# Patient Record
Sex: MALE | Race: WHITE | HISPANIC OR LATINO | URBAN - METROPOLITAN AREA
[De-identification: names, ages, dates, MRNs, and addresses within clinical notes are randomized per-mention and may not be internally consistent; named-entity substitution may affect disease eponyms.]

---

## 2019-01-27 ENCOUNTER — INPATIENT (INPATIENT)
Facility: HOSPITAL | Age: 74
LOS: 3 days | Discharge: HOME | DRG: 390 | End: 2019-01-31
Attending: SPECIALIST | Admitting: SPECIALIST
Payer: MEDICARE

## 2019-01-27 VITALS
RESPIRATION RATE: 18 BRPM | DIASTOLIC BLOOD PRESSURE: 70 MMHG | HEART RATE: 95 BPM | TEMPERATURE: 98 F | OXYGEN SATURATION: 96 % | SYSTOLIC BLOOD PRESSURE: 110 MMHG

## 2019-01-27 DIAGNOSIS — E03.9 HYPOTHYROIDISM, UNSPECIFIED: ICD-10-CM

## 2019-01-27 DIAGNOSIS — K56.609 UNSPECIFIED INTESTINAL OBSTRUCTION, UNSPECIFIED AS TO PARTIAL VERSUS COMPLETE OBSTRUCTION: ICD-10-CM

## 2019-01-27 DIAGNOSIS — I10 ESSENTIAL (PRIMARY) HYPERTENSION: ICD-10-CM

## 2019-01-27 DIAGNOSIS — Z90.49 ACQUIRED ABSENCE OF OTHER SPECIFIED PARTS OF DIGESTIVE TRACT: Chronic | ICD-10-CM

## 2019-01-27 DIAGNOSIS — N40.0 BENIGN PROSTATIC HYPERPLASIA WITHOUT LOWER URINARY TRACT SYMPTOMS: ICD-10-CM

## 2019-01-27 LAB
ALBUMIN SERPL ELPH-MCNC: 3.8 G/DL — SIGNIFICANT CHANGE UP (ref 3.5–5)
ALP SERPL-CCNC: 87 U/L — SIGNIFICANT CHANGE UP (ref 40–120)
ALT FLD-CCNC: 37 U/L DA — SIGNIFICANT CHANGE UP (ref 10–60)
ANION GAP SERPL CALC-SCNC: 8 MMOL/L — SIGNIFICANT CHANGE UP (ref 5–17)
APPEARANCE UR: CLEAR — SIGNIFICANT CHANGE UP
APTT BLD: 27 SEC — LOW (ref 27.5–36.3)
AST SERPL-CCNC: 27 U/L — SIGNIFICANT CHANGE UP (ref 10–40)
BASOPHILS # BLD AUTO: 0.1 K/UL — SIGNIFICANT CHANGE UP (ref 0–0.2)
BASOPHILS NFR BLD AUTO: 0.5 % — SIGNIFICANT CHANGE UP (ref 0–2)
BILIRUB SERPL-MCNC: 0.9 MG/DL — SIGNIFICANT CHANGE UP (ref 0.2–1.2)
BILIRUB UR-MCNC: ABNORMAL
BUN SERPL-MCNC: 30 MG/DL — HIGH (ref 7–18)
CALCIUM SERPL-MCNC: 8.9 MG/DL — SIGNIFICANT CHANGE UP (ref 8.4–10.5)
CHLORIDE SERPL-SCNC: 101 MMOL/L — SIGNIFICANT CHANGE UP (ref 96–108)
CO2 SERPL-SCNC: 29 MMOL/L — SIGNIFICANT CHANGE UP (ref 22–31)
COLOR SPEC: YELLOW — SIGNIFICANT CHANGE UP
CREAT SERPL-MCNC: 1.05 MG/DL — SIGNIFICANT CHANGE UP (ref 0.5–1.3)
DIFF PNL FLD: ABNORMAL
EOSINOPHIL # BLD AUTO: 0.1 K/UL — SIGNIFICANT CHANGE UP (ref 0–0.5)
EOSINOPHIL NFR BLD AUTO: 0.5 % — SIGNIFICANT CHANGE UP (ref 0–6)
GLUCOSE SERPL-MCNC: 120 MG/DL — HIGH (ref 70–99)
GLUCOSE UR QL: NEGATIVE — SIGNIFICANT CHANGE UP
HCT VFR BLD CALC: 42.7 % — SIGNIFICANT CHANGE UP (ref 39–50)
HGB BLD-MCNC: 14.1 G/DL — SIGNIFICANT CHANGE UP (ref 13–17)
INR BLD: 1.1 RATIO — SIGNIFICANT CHANGE UP (ref 0.88–1.16)
KETONES UR-MCNC: ABNORMAL
LACTATE SERPL-SCNC: 1.2 MMOL/L — SIGNIFICANT CHANGE UP (ref 0.7–2)
LEUKOCYTE ESTERASE UR-ACNC: ABNORMAL
LIDOCAIN IGE QN: 107 U/L — SIGNIFICANT CHANGE UP (ref 73–393)
LYMPHOCYTES # BLD AUTO: 1.9 K/UL — SIGNIFICANT CHANGE UP (ref 1–3.3)
LYMPHOCYTES # BLD AUTO: 18.1 % — SIGNIFICANT CHANGE UP (ref 13–44)
MCHC RBC-ENTMCNC: 29 PG — SIGNIFICANT CHANGE UP (ref 27–34)
MCHC RBC-ENTMCNC: 33 GM/DL — SIGNIFICANT CHANGE UP (ref 32–36)
MCV RBC AUTO: 88 FL — SIGNIFICANT CHANGE UP (ref 80–100)
MONOCYTES # BLD AUTO: 0.8 K/UL — SIGNIFICANT CHANGE UP (ref 0–0.9)
MONOCYTES NFR BLD AUTO: 7.6 % — SIGNIFICANT CHANGE UP (ref 2–14)
NEUTROPHILS # BLD AUTO: 7.7 K/UL — HIGH (ref 1.8–7.4)
NEUTROPHILS NFR BLD AUTO: 73.3 % — SIGNIFICANT CHANGE UP (ref 43–77)
NITRITE UR-MCNC: NEGATIVE — SIGNIFICANT CHANGE UP
PH UR: 5 — SIGNIFICANT CHANGE UP (ref 5–8)
PLATELET # BLD AUTO: 308 K/UL — SIGNIFICANT CHANGE UP (ref 150–400)
POTASSIUM SERPL-MCNC: 3.3 MMOL/L — LOW (ref 3.5–5.3)
POTASSIUM SERPL-SCNC: 3.3 MMOL/L — LOW (ref 3.5–5.3)
PROT SERPL-MCNC: 7.7 G/DL — SIGNIFICANT CHANGE UP (ref 6–8.3)
PROT UR-MCNC: 100
PROTHROM AB SERPL-ACNC: 12.2 SEC — SIGNIFICANT CHANGE UP (ref 10–12.9)
RBC # BLD: 4.85 M/UL — SIGNIFICANT CHANGE UP (ref 4.2–5.8)
RBC # FLD: 12.2 % — SIGNIFICANT CHANGE UP (ref 10.3–14.5)
SODIUM SERPL-SCNC: 138 MMOL/L — SIGNIFICANT CHANGE UP (ref 135–145)
SP GR SPEC: 1.02 — SIGNIFICANT CHANGE UP (ref 1.01–1.02)
UROBILINOGEN FLD QL: 4
WBC # BLD: 10.5 K/UL — SIGNIFICANT CHANGE UP (ref 3.8–10.5)
WBC # FLD AUTO: 10.5 K/UL — SIGNIFICANT CHANGE UP (ref 3.8–10.5)

## 2019-01-27 PROCEDURE — 99223 1ST HOSP IP/OBS HIGH 75: CPT

## 2019-01-27 PROCEDURE — 99285 EMERGENCY DEPT VISIT HI MDM: CPT

## 2019-01-27 PROCEDURE — 74177 CT ABD & PELVIS W/CONTRAST: CPT | Mod: 26

## 2019-01-27 RX ORDER — METOPROLOL TARTRATE 50 MG
2.5 TABLET ORAL EVERY 6 HOURS
Qty: 0 | Refills: 0 | Status: DISCONTINUED | OUTPATIENT
Start: 2019-01-27 | End: 2019-01-30

## 2019-01-27 RX ORDER — IOHEXOL 300 MG/ML
30 INJECTION, SOLUTION INTRAVENOUS ONCE
Qty: 0 | Refills: 0 | Status: COMPLETED | OUTPATIENT
Start: 2019-01-27 | End: 2019-01-27

## 2019-01-27 RX ORDER — ONDANSETRON 8 MG/1
4 TABLET, FILM COATED ORAL EVERY 6 HOURS
Qty: 0 | Refills: 0 | Status: DISCONTINUED | OUTPATIENT
Start: 2019-01-27 | End: 2019-01-31

## 2019-01-27 RX ORDER — INSULIN LISPRO 100/ML
VIAL (ML) SUBCUTANEOUS EVERY 6 HOURS
Qty: 0 | Refills: 0 | Status: DISCONTINUED | OUTPATIENT
Start: 2019-01-27 | End: 2019-01-31

## 2019-01-27 RX ORDER — LEVOTHYROXINE SODIUM 125 MCG
25 TABLET ORAL
Qty: 0 | Refills: 0 | Status: DISCONTINUED | OUTPATIENT
Start: 2019-01-27 | End: 2019-01-30

## 2019-01-27 RX ORDER — HEPARIN SODIUM 5000 [USP'U]/ML
5000 INJECTION INTRAVENOUS; SUBCUTANEOUS EVERY 8 HOURS
Qty: 0 | Refills: 0 | Status: DISCONTINUED | OUTPATIENT
Start: 2019-01-27 | End: 2019-01-31

## 2019-01-27 RX ORDER — ONDANSETRON 8 MG/1
4 TABLET, FILM COATED ORAL ONCE
Qty: 0 | Refills: 0 | Status: COMPLETED | OUTPATIENT
Start: 2019-01-27 | End: 2019-01-27

## 2019-01-27 RX ORDER — SODIUM CHLORIDE 9 MG/ML
3 INJECTION INTRAMUSCULAR; INTRAVENOUS; SUBCUTANEOUS ONCE
Qty: 0 | Refills: 0 | Status: COMPLETED | OUTPATIENT
Start: 2019-01-27 | End: 2019-01-27

## 2019-01-27 RX ORDER — DEXTROSE MONOHYDRATE, SODIUM CHLORIDE, AND POTASSIUM CHLORIDE 50; .745; 4.5 G/1000ML; G/1000ML; G/1000ML
1000 INJECTION, SOLUTION INTRAVENOUS
Qty: 0 | Refills: 0 | Status: DISCONTINUED | OUTPATIENT
Start: 2019-01-27 | End: 2019-01-31

## 2019-01-27 RX ORDER — FAMOTIDINE 10 MG/ML
20 INJECTION INTRAVENOUS ONCE
Qty: 0 | Refills: 0 | Status: COMPLETED | OUTPATIENT
Start: 2019-01-27 | End: 2019-01-27

## 2019-01-27 RX ADMIN — Medication 25 MICROGRAM(S): at 23:59

## 2019-01-27 RX ADMIN — FAMOTIDINE 20 MILLIGRAM(S): 10 INJECTION INTRAVENOUS at 16:55

## 2019-01-27 RX ADMIN — IOHEXOL 30 MILLILITER(S): 300 INJECTION, SOLUTION INTRAVENOUS at 16:55

## 2019-01-27 RX ADMIN — ONDANSETRON 4 MILLIGRAM(S): 8 TABLET, FILM COATED ORAL at 16:55

## 2019-01-27 RX ADMIN — SODIUM CHLORIDE 3 MILLILITER(S): 9 INJECTION INTRAMUSCULAR; INTRAVENOUS; SUBCUTANEOUS at 16:49

## 2019-01-27 NOTE — CONSULT NOTE ADULT - PROBLEM SELECTOR RECOMMENDATION 2
Home meds coreg 6.25 q12, HCTz-losartan 100-25, Nifedipine ER 90  Currently BP stable, recommend to c/w lopressor 2.5 q6 for now  Restart the home meds when start tolerating diet Home meds coreg 6.25 q12, HCTz-losartan 100-25, Nifedipine ER 90, aspirin 81 mg  Currently BP stable, recommend to c/w lopressor 2.5 q6 for now  Restart the home meds when start tolerating diet

## 2019-01-27 NOTE — H&P ADULT - NSHPPHYSICALEXAM_GEN_ALL_CORE
NAD  alert, oriented x3  very comfortable  S1S2  abd distended, tympany, non tender, no rebound or guarding  ext no c/c/e

## 2019-01-27 NOTE — ED ADULT NURSE NOTE - OBJECTIVE STATEMENT
presented to ed c/o abdominal pain with nausea and vomiting for 3 days has h/o gall stones and h/o adhesions in the past several times

## 2019-01-27 NOTE — ED PROVIDER NOTE - MEDICAL DECISION MAKING DETAILS
72 y/o M pt with aforementioned presentation, concerning for SBO. Will check labs, imaging and reassess.

## 2019-01-27 NOTE — CONSULT NOTE ADULT - ASSESSMENT
73 M, PMhx of HTN, HLD, Hypothyroidism, SBO (x 3, managed medically), PShx of cholecystectomy in 2006, p/w recurrent abdominal pain associated with vomiting since last 2 days, admitted for high grade SBO, medicine consult called for routine medical f/up for HTN.

## 2019-01-27 NOTE — H&P ADULT - ASSESSMENT
72 y/o male with prev surgical history, mult episodes SBO  admitted with recurrent SBO without abd pain, no leukocytosis and normal lactate levels

## 2019-01-27 NOTE — ED PROVIDER NOTE - PHYSICAL EXAMINATION
General: pt lying in stretcher, appears stated age and is not in distress  HEENT: AT/NC, pink conjunctiva, anicteric sclerae, EOMI, PERRLA, TMs smooth, grey, intact, with normal landmarks, nasal mucosa pink, no discharge, turbinates not enlarged; moist mucus membranes, tongue well-papillated, good dentition; posterior pharynx shows no erythema or exudates;   Neck: supple, full ROM, trachea midline, no JVD, no cervical LAD, no midline ttp or stepoffs  Lungs: symmetric excursion, b/l clear vesicular breath sounds with no wheezes, crackles, or rhonchi  Heart: rrr, S1, S2 normal; no S3 or S4; no murmurs or rubs  Abd: normal bowel sounds; soft, nontender; negative McBurney's point tenderness, negative Bae's sign, no rebound, no guarding, spleen non-palpable; no hepatomegaly, no masses  Back: no midline spinal tenderness or stepoffs, no costovertebral angle tenderness  Extremities: no clubbing, cyanosis, or edema; no palpable deformities or fractures  Skin: good turgor; no rashes, petechiae, ecchymoses, or jaundice  Pulses: radial, posterior tibialis (PT), dorsalis pedis (DP) all 2+ & symmetric  Neuro: awake, alert, responsive; oriented to person, place and time; cranial nerves intact, EOMI, intact jaw movement, intact facial sensation, no facial asymmetry, hearing intact; no nystagmus, tongue midline; Motor: Normal tone in upper and lower extremities bilaterally strength 5/5; Sensory: intact to pinprick and light touch; Cerebellar: finger-to-nose intact; normal steady gait; negative Romberg’s sign; DTR: biceps, triceps, patellar, 2+, no pronator drift General: pt appears stated age and is not in distress  HEENT: AT/NC, pink conjunctiva, anicteric sclerae, EOMI, moist mucus membranes  Neck: supple, full ROM, trachea midline  Lungs: symmetric excursion, b/l clear vesicular breath sounds with no wheezes, crackles, or rhonchi  Heart: rrr, S1, S2 normal; no S3 or S4; no murmurs or rubs  Abd: obese, old ex-lap scar, hypoactive bowel sounds; soft, mild epigastric tenderness to palpation; negative McBurney's point tenderness, negative Bae's sign, no rebound, no guarding, spleen non-palpable; no hepatomegaly, no masses  Back: no midline spinal tenderness or stepoffs, no costovertebral angle tenderness  Extremities: no palpable deformities or fractures  Skin: good turgor; no rashes, petechiae, ecchymoses, or jaundice  Pulses: radial, posterior tibialis (PT), dorsalis pedis (DP) all 2+ & symmetric  Neuro: awake, alert, responsive; oriented to person, place and time; cranial nerves intact, EOMI, intact jaw movement, intact facial sensation, no facial asymmetry, hearing intact; no nystagmus, tongue midline; Motor: Normal tone in upper and lower extremities bilaterally ; Sensory: intact; baseline steady gait w/ cane

## 2019-01-27 NOTE — CONSULT NOTE ADULT - SUBJECTIVE AND OBJECTIVE BOX
Patient is a 73y old  Male who presents with a chief complaint of     Consult: 73 M, PMhx of HTN, HLD, Hypothyroidism, SBO (x 3, managed medically), PShx of cholecystectomy in , p/w recurrent abdominal pain associated with vomiting since last 2 days. Pain started on Friday night, last BM yesterday. Currently reports slight improvement in pain. Denies any chest pain, sob, cough, urinary complains. Last colonoscopy in , shows 2 polyps. In the ED vitals signs stable, labs wnl except K of 3.3, CT scan shows high grade SBO, enlarged prostate. Sump tube placed, admitted for high grade SBO, medicine consult called for routine medical f/up for HTN.     PShx: As above  Shx: Non smoker, non alcoholic, no substance abuse       MEDICATIONS  (STANDING):  dextrose 5% + sodium chloride 0.45% with potassium chloride 20 mEq/L 1000 milliLiter(s) (125 mL/Hr) IV Continuous <Continuous>  heparin  Injectable 5000 Unit(s) SubCutaneous every 8 hours  levothyroxine Injectable 25 MICROGram(s) IV Push <User Schedule>  metoprolol tartrate Injectable 2.5 milliGRAM(s) IV Push every 6 hours    MEDICATIONS  (PRN):  ondansetron Injectable 4 milliGRAM(s) IV Push every 6 hours PRN Nausea and/or Vomiting      Allergies    No Known Allergies    Intolerances      __________________________________________________  REVIEW OF SYSTEMS:    CONSTITUTIONAL: No fever,   EYES: no acute visual disturbances  NECK: No pain or stiffness  RESPIRATORY: No cough; No shortness of breath  CARDIOVASCULAR: No chest pain, no palpitations  GASTROINTESTINAL: + pain. + nausea, + vomiting; No diarrhea   NEUROLOGICAL: No headache or numbness, no tremors  HEME/LYMPH: No  bleeding gums    Vital Signs Last 24 Hrs  T(C): 36.6 (2019 13:44), Max: 36.6 (2019 13:44)  T(F): 97.8 (2019 13:44), Max: 97.8 (2019 13:44)  HR: 95 (2019 13:44) (95 - 95)  BP: 110/70 (2019 13:44) (110/70 - 110/70)  BP(mean): --  RR: 18 (2019 13:44) (18 - 18)  SpO2: 96% (2019 13:44) (96% - 96%)    ________________________________________________  PHYSICAL EXAM:  GENERAL: NAD,   HEAD:  , Normocephalic  EYES:  conjunctiva and sclera clear  NECK: Supple, No JVD    NERVOUS SYSTEM:  Alert & Oriented X3,   CHEST/LUNG: Clear to auscuitation bilaterally;   HEART: S1 S2+;   ABDOMEN: Soft, Nontender, distended; Bowel sounds weakly present   EXTREMITIES: no cyanosis; no edema; no calf tenderness    _________________________________________________  LABS:                        14.1   10.5  )-----------( 308      ( 2019 16:19 )             42.7         138  |  101  |  30<H>  ----------------------------<  120<H>  3.3<L>   |  29  |  1.05    Ca    8.9      2019 16:19    TPro  7.7  /  Alb  3.8  /  TBili  0.9  /  DBili  x   /  AST  27  /  ALT  37  /  AlkPhos  87      PT/INR - ( 2019 16:19 )   PT: 12.2 sec;   INR: 1.10 ratio         PTT - ( 2019 16:19 )  PTT:27.0 sec  Urinalysis Basic - ( 2019 16:19 )    Color: Yellow / Appearance: Clear / S.020 / pH: x  Gluc: x / Ketone: Small  / Bili: Moderate / Urobili: 4   Blood: x / Protein: 100 / Nitrite: Negative   Leuk Esterase: Trace / RBC: 0-2 /HPF / WBC 6-10 /HPF   Sq Epi: x / Non Sq Epi: Few /HPF / Bacteria: Few /HPF      < from: CT Abdomen and Pelvis w/ Oral Cont and w/ IV Cont (19 @ 19:22) >  IMPRESSION:   1. Multiple very distended loops of small bowel measuring up to 4.5 cm   consistent with high-grade small bowel obstruction. Transition point is   seen in   right mesentery on axial series 2 images 65-71.   2. Stones in the gallbladder. No gallbladder wall thickening or   pericholecystic   fluid.   3. Enlarged prostate measuring 7.1 x 5.3 cm.    < end of copied text >

## 2019-01-27 NOTE — ED PROVIDER NOTE - OBJECTIVE STATEMENT
72 y/o M pt with PMHx of cholelithiasis s/p cholecystectomy (complicated by retained segment of gallbladder), SBO, and HTN presents to ED c/o epigastric abd pain and multiple episodes of vomiting x Friday (3days). Pt is unable to tolerate PO and reports symptoms are similar to piror episode of SBO. Patient reports one episode of black vomitus. Patient denies fever, chills, night sweats, or any other complaints. 72 y/o M pt with PMHx of cholelithiasis s/p cholecystectomy (complicated by retained segment of gallbladder), multiple episodes of SBO treated w/ NGT decompression, and HTN presents to ED c/o epigastric abd pain and multiple episodes of vomiting x Friday (3days). Pt is unable to tolerate PO and reports symptoms are similar to piror episode of SBO. Patient reports one episode of black vomitus. Patient denies fever, chills, night sweats, or any other complaints. Pt refused rectal exam for guaiac and insists he has not had any black stool or blood in his stool.

## 2019-01-27 NOTE — H&P ADULT - HISTORY OF PRESENT ILLNESS
73 M, PMhx of HTN, HLD, Hypothyroidism, SBO (x 3, managed medically), PShx of exploratory surgery in 2006 per pt, p/w recurrent abdominal pain associated with vomiting since last 2 days. Pain started on Friday night, last BM yesterday. Currently reports slight improvement in pain  no f/c  denies flatus  currently no abd pain    < from: CT Abdomen and Pelvis w/ Oral Cont and w/ IV Cont (01.27.19 @ 19:22) >  ABDOMEN:    Liver: Normal. No mass.    Gallbladder and bile ducts:  Stones in the gallbladder. No gallbladder   wall   thickening or pericholecystic fluid.    Pancreas: Normal. No ductal dilation.    Spleen: Normal. No splenomegaly.    Adrenals: Normal. No mass.    Kidneys and ureters:  Cysts in left kidney measuring up to 7.1 cm.    Stomach and bowel:  Multiple very distended loops of small bowel   measuring up   to 4.5 cm consistent with high-grade small bowel obstruction. Transition   point   is seen in right mesentery on axial series 2 images 65-71.    Appendix:  Normal appendix seen.       < end of copied text >

## 2019-01-28 LAB
ANION GAP SERPL CALC-SCNC: 9 MMOL/L — SIGNIFICANT CHANGE UP (ref 5–17)
BUN SERPL-MCNC: 33 MG/DL — HIGH (ref 7–18)
CALCIUM SERPL-MCNC: 8.1 MG/DL — LOW (ref 8.4–10.5)
CHLORIDE SERPL-SCNC: 101 MMOL/L — SIGNIFICANT CHANGE UP (ref 96–108)
CO2 SERPL-SCNC: 27 MMOL/L — SIGNIFICANT CHANGE UP (ref 22–31)
CREAT SERPL-MCNC: 1.1 MG/DL — SIGNIFICANT CHANGE UP (ref 0.5–1.3)
GLUCOSE BLDC GLUCOMTR-MCNC: 114 MG/DL — HIGH (ref 70–99)
GLUCOSE BLDC GLUCOMTR-MCNC: 127 MG/DL — HIGH (ref 70–99)
GLUCOSE BLDC GLUCOMTR-MCNC: 135 MG/DL — HIGH (ref 70–99)
GLUCOSE BLDC GLUCOMTR-MCNC: 232 MG/DL — HIGH (ref 70–99)
GLUCOSE SERPL-MCNC: 117 MG/DL — HIGH (ref 70–99)
HCT VFR BLD CALC: 2.7 % — CRITICAL LOW (ref 39–50)
HGB BLD-MCNC: 1 G/DL — CRITICAL LOW (ref 13–17)
MCHC RBC-ENTMCNC: 32.4 PG — SIGNIFICANT CHANGE UP (ref 27–34)
MCHC RBC-ENTMCNC: 37.2 GM/DL — HIGH (ref 32–36)
MCV RBC AUTO: 87.1 FL — SIGNIFICANT CHANGE UP (ref 80–100)
PLATELET # BLD AUTO: 16 K/UL — CRITICAL LOW (ref 150–400)
POTASSIUM SERPL-MCNC: 3.2 MMOL/L — LOW (ref 3.5–5.3)
POTASSIUM SERPL-SCNC: 3.2 MMOL/L — LOW (ref 3.5–5.3)
RBC # BLD: 0.31 M/UL — LOW (ref 4.2–5.8)
RBC # FLD: 12.4 % — SIGNIFICANT CHANGE UP (ref 10.3–14.5)
SODIUM SERPL-SCNC: 137 MMOL/L — SIGNIFICANT CHANGE UP (ref 135–145)
WBC # BLD: 1.1 K/UL — LOW (ref 3.8–10.5)
WBC # FLD AUTO: 1.1 K/UL — LOW (ref 3.8–10.5)

## 2019-01-28 PROCEDURE — 99232 SBSQ HOSP IP/OBS MODERATE 35: CPT

## 2019-01-28 RX ORDER — POTASSIUM CHLORIDE 20 MEQ
10 PACKET (EA) ORAL
Qty: 0 | Refills: 0 | Status: COMPLETED | OUTPATIENT
Start: 2019-01-28 | End: 2019-01-28

## 2019-01-28 RX ADMIN — Medication 100 MILLIEQUIVALENT(S): at 08:24

## 2019-01-28 RX ADMIN — HEPARIN SODIUM 5000 UNIT(S): 5000 INJECTION INTRAVENOUS; SUBCUTANEOUS at 00:00

## 2019-01-28 RX ADMIN — HEPARIN SODIUM 5000 UNIT(S): 5000 INJECTION INTRAVENOUS; SUBCUTANEOUS at 15:08

## 2019-01-28 RX ADMIN — Medication 2.5 MILLIGRAM(S): at 21:01

## 2019-01-28 RX ADMIN — Medication 100 MILLIEQUIVALENT(S): at 08:55

## 2019-01-28 RX ADMIN — HEPARIN SODIUM 5000 UNIT(S): 5000 INJECTION INTRAVENOUS; SUBCUTANEOUS at 23:10

## 2019-01-28 RX ADMIN — Medication 100 MILLIEQUIVALENT(S): at 07:25

## 2019-01-28 RX ADMIN — Medication 25 MICROGRAM(S): at 06:37

## 2019-01-28 RX ADMIN — DEXTROSE MONOHYDRATE, SODIUM CHLORIDE, AND POTASSIUM CHLORIDE 125 MILLILITER(S): 50; .745; 4.5 INJECTION, SOLUTION INTRAVENOUS at 07:24

## 2019-01-28 RX ADMIN — Medication 2.5 MILLIGRAM(S): at 00:00

## 2019-01-28 RX ADMIN — Medication 2.5 MILLIGRAM(S): at 13:20

## 2019-01-28 RX ADMIN — HEPARIN SODIUM 5000 UNIT(S): 5000 INJECTION INTRAVENOUS; SUBCUTANEOUS at 06:37

## 2019-01-28 RX ADMIN — Medication 2.5 MILLIGRAM(S): at 06:38

## 2019-01-28 NOTE — PROGRESS NOTE ADULT - SUBJECTIVE AND OBJECTIVE BOX
pt seen in icu [  ], reg med floor [ x  ], bed [ x ], chair at bedside [   ]    Awake ,alert ,lying in bed in NAD.    REVIEW OF SYSTEMS:    CONSTITUTIONAL: No weakness, fevers or chills  EYES/ENT: No visual changes;  No vertigo or throat pain   NECK: No pain or stiffness  RESPIRATORY: No cough, wheezing, hemoptysis; No shortness of breath  CARDIOVASCULAR: No chest pain or palpitations  GASTROINTESTINAL: No abdominal or epigastric pain. No nausea, vomiting, or hematemesis; No diarrhea or constipation. No melena or hematochezia.  GENITOURINARY: No dysuria, frequency or hematuria  NEUROLOGICAL: No numbness or weakness  SKIN: No itching, burning, rashes, or lesions   All other review of systems is negative unless indicated above.    Physical Exam    General: WN/WD NAD  Neurology: A&Ox3, nonfocal, TORRES x 4  Respiratory: CTA B/L  CV: RRR, S1S2, no murmurs, rubs or gallops  Abdominal: Soft, NT, ND +BS, Last BM  Extremities: No edema, + peripheral pulses      Allergies  No Known Allergies      Health Issues  Intestinal obstruction  SBO (small bowel obstruction)  HTN (hypertension)  Cholelithiasis  History of cholecystectomy      Vitals  T(F): 98.9 (01-28-19 @ 10:20), Max: 98.9 (01-28-19 @ 10:20)  HR: 97 (01-28-19 @ 10:20) (88 - 98)  BP: 132/61 (01-28-19 @ 10:20) (114/60 - 132/61)  RR: 18 (01-28-19 @ 10:20) (17 - 18)  SpO2: 98% (01-28-19 @ 10:20) (95% - 98%)  Wt(kg): --  CAPILLARY BLOOD GLUCOSE      POCT Blood Glucose.: 232 mg/dL (28 Jan 2019 12:06)      Labs                          13.1   10.4  )-----------( 299      ( 28 Jan 2019 05:40 )             39.4       01-28    137  |  101  |  33<H>  ----------------------------<  117<H>  3.2<L>   |  27  |  1.10    Ca    8.1<L>      28 Jan 2019 05:40    TPro  7.7  /  Alb  3.8  /  TBili  0.9  /  DBili  x   /  AST  27  /  ALT  37  /  AlkPhos  87  01-27            Radiology Results    < from: CT Abdomen and Pelvis w/ Oral Cont and w/ IV Cont (01.27.19 @ 19:22) >  IMPRESSION:   1. Multiple very distended loops of small bowel measuring up to 4.5 cm   consistent with high-grade small bowel obstruction. Transition point is   seen in   right mesentery on axial series 2 images 65-71.   2. Stones in the gallbladder. No gallbladder wall thickening or   pericholecystic   fluid.   3. Enlarged prostate measuring 7.1 x 5.3 cm.    < end of copied text >      Meds    MEDICATIONS  (STANDING):  dextrose 5% + sodium chloride 0.45% with potassium chloride 20 mEq/L 1000 milliLiter(s) (125 mL/Hr) IV Continuous <Continuous>  heparin  Injectable 5000 Unit(s) SubCutaneous every 8 hours  insulin lispro (HumaLOG) corrective regimen sliding scale   SubCutaneous every 6 hours  levothyroxine Injectable 25 MICROGram(s) IV Push <User Schedule>  metoprolol tartrate Injectable 2.5 milliGRAM(s) IV Push every 6 hours      MEDICATIONS  (PRN):  ondansetron Injectable 4 milliGRAM(s) IV Push every 6 hours PRN Nausea and/or Vomiting pt seen in icu [  ], reg med floor [ x  ], bed [ x ], chair at bedside [   ]    Awake ,alert ,lying in bed in NAD. Still with NGT in place.    REVIEW OF SYSTEMS:    CONSTITUTIONAL: No weakness, fevers or chills  EYES/ENT: No visual changes;  No vertigo or throat pain   NECK: No pain or stiffness  RESPIRATORY: No cough, wheezing, hemoptysis; No shortness of breath  CARDIOVASCULAR: No chest pain or palpitations  GASTROINTESTINAL: + abdominal  pain. No nausea, vomiting, or hematemesis; No diarrhea or constipation. No melena or hematochezia.  GENITOURINARY: No dysuria, frequency or hematuria  NEUROLOGICAL: No numbness or weakness  SKIN: No itching, burning, rashes, or lesions   All other review of systems is negative unless indicated above.    Physical Exam    General: WN/WD NAD  Neurology: A&Ox3, nonfocal, TORRES x 4  Respiratory: CTA B/L  CV: RRR, S1S2, no murmurs, rubs or gallops  Abdominal:  mildly tender and distended,  +BS, Last BM  Extremities: No edema, + peripheral pulses      Allergies  No Known Allergies      Health Issues  Intestinal obstruction  SBO (small bowel obstruction)  HTN (hypertension)  Cholelithiasis  History of cholecystectomy      Vitals  T(F): 98.9 (01-28-19 @ 10:20), Max: 98.9 (01-28-19 @ 10:20)  HR: 97 (01-28-19 @ 10:20) (88 - 98)  BP: 132/61 (01-28-19 @ 10:20) (114/60 - 132/61)  RR: 18 (01-28-19 @ 10:20) (17 - 18)  SpO2: 98% (01-28-19 @ 10:20) (95% - 98%)  Wt(kg): --  CAPILLARY BLOOD GLUCOSE      POCT Blood Glucose.: 232 mg/dL (28 Jan 2019 12:06)      Labs                          13.1   10.4  )-----------( 299      ( 28 Jan 2019 05:40 )             39.4       01-28    137  |  101  |  33<H>  ----------------------------<  117<H>  3.2<L>   |  27  |  1.10    Ca    8.1<L>      28 Jan 2019 05:40    TPro  7.7  /  Alb  3.8  /  TBili  0.9  /  DBili  x   /  AST  27  /  ALT  37  /  AlkPhos  87  01-27            Radiology Results    < from: CT Abdomen and Pelvis w/ Oral Cont and w/ IV Cont (01.27.19 @ 19:22) >  IMPRESSION:   1. Multiple very distended loops of small bowel measuring up to 4.5 cm   consistent with high-grade small bowel obstruction. Transition point is   seen in   right mesentery on axial series 2 images 65-71.   2. Stones in the gallbladder. No gallbladder wall thickening or   pericholecystic   fluid.   3. Enlarged prostate measuring 7.1 x 5.3 cm.    < end of copied text >      Meds    MEDICATIONS  (STANDING):  dextrose 5% + sodium chloride 0.45% with potassium chloride 20 mEq/L 1000 milliLiter(s) (125 mL/Hr) IV Continuous <Continuous>  heparin  Injectable 5000 Unit(s) SubCutaneous every 8 hours  insulin lispro (HumaLOG) corrective regimen sliding scale   SubCutaneous every 6 hours  levothyroxine Injectable 25 MICROGram(s) IV Push <User Schedule>  metoprolol tartrate Injectable 2.5 milliGRAM(s) IV Push every 6 hours      MEDICATIONS  (PRN):  ondansetron Injectable 4 milliGRAM(s) IV Push every 6 hours PRN Nausea and/or Vomiting

## 2019-01-28 NOTE — PROGRESS NOTE ADULT - PROBLEM SELECTOR PLAN 1
Management as per surgery.  IVF,  NPO,   NGT suction. Management as per surgery.  IVF,  NPO,   NGT suction.  Replace lytes

## 2019-01-28 NOTE — PROGRESS NOTE ADULT - ATTENDING COMMENTS
As above  Pt with SBO improved overnight, pain less    Abd  softly distended minimal tenderness, no peritoneal findings  + flatus    WBC wnl  Imp  SBO, clinically improved  Plan  Obstructive series

## 2019-01-28 NOTE — ED ADULT NURSE REASSESSMENT NOTE - NS ED NURSE REASSESS COMMENT FT1
0800 pm - NG out put 800 .
0800 axox3 ,nad , NPO with NG tube since last night , admitted to reg floor , awaiting for a bed availability .

## 2019-01-28 NOTE — PROGRESS NOTE ADULT - SUBJECTIVE AND OBJECTIVE BOX
INTERVAL HPI/OVERNIGHT EVENTS:    Pt seen and examined at bedside. Abdominal pain somewhat improved, denies nausea or vomiting. Admits to flatus, no BM. Pt is NPO.   NGT lws.     Vital Signs Last 24 Hrs  T(C): 37.1 (28 Jan 2019 07:28), Max: 37.1 (28 Jan 2019 07:28)  T(F): 98.7 (28 Jan 2019 07:28), Max: 98.7 (28 Jan 2019 07:28)  HR: 98 (28 Jan 2019 07:28) (88 - 98)  BP: 114/60 (28 Jan 2019 07:28) (110/70 - 120/68)  BP(mean): --  RR: 17 (28 Jan 2019 07:28) (17 - 18)  SpO2: 97% (28 Jan 2019 07:28) (95% - 97%)  I&O's Detail        Physical Exam  General: AAOx3, No acute distress  Skin: No jaundice, no icterus  Abdomen: soft, mildly distended, nontender, no rebound tenderness, no guarding, no palpable masses  Extremities: non edematous, no calf pain bilaterally        Labs:                        13.1   10.4  )-----------( 299      ( 28 Jan 2019 05:40 )             39.4     01-28    137  |  101  |  33<H>  ----------------------------<  117<H>  3.2<L>   |  27  |  1.10    Ca    8.1<L>      28 Jan 2019 05:40    TPro  7.7  /  Alb  3.8  /  TBili  0.9  /  DBili  x   /  AST  27  /  ALT  37  /  AlkPhos  87  01-27    PT/INR - ( 27 Jan 2019 16:19 )   PT: 12.2 sec;   INR: 1.10 ratio         PTT - ( 27 Jan 2019 16:19 )  PTT:27.0 sec

## 2019-01-28 NOTE — PROGRESS NOTE ADULT - ASSESSMENT
74 y/o Male w/ SBO, hypokalemia     -NPO  -IVF   -C/w NGT lws   -Replace K levels   -F/u AM AXR   -C/w home meds   -DVT ppx

## 2019-01-29 LAB
ANION GAP SERPL CALC-SCNC: 7 MMOL/L — SIGNIFICANT CHANGE UP (ref 5–17)
BUN SERPL-MCNC: 12 MG/DL — SIGNIFICANT CHANGE UP (ref 7–18)
CALCIUM SERPL-MCNC: 7.8 MG/DL — LOW (ref 8.4–10.5)
CHLORIDE SERPL-SCNC: 103 MMOL/L — SIGNIFICANT CHANGE UP (ref 96–108)
CO2 SERPL-SCNC: 28 MMOL/L — SIGNIFICANT CHANGE UP (ref 22–31)
CREAT SERPL-MCNC: 0.84 MG/DL — SIGNIFICANT CHANGE UP (ref 0.5–1.3)
GLUCOSE BLDC GLUCOMTR-MCNC: 119 MG/DL — HIGH (ref 70–99)
GLUCOSE BLDC GLUCOMTR-MCNC: 130 MG/DL — HIGH (ref 70–99)
GLUCOSE BLDC GLUCOMTR-MCNC: 135 MG/DL — HIGH (ref 70–99)
GLUCOSE SERPL-MCNC: 151 MG/DL — HIGH (ref 70–99)
HCT VFR BLD CALC: 38.7 % — LOW (ref 39–50)
HGB BLD-MCNC: 12.8 G/DL — LOW (ref 13–17)
MAGNESIUM SERPL-MCNC: 2.3 MG/DL — SIGNIFICANT CHANGE UP (ref 1.6–2.6)
MCHC RBC-ENTMCNC: 28.7 PG — SIGNIFICANT CHANGE UP (ref 27–34)
MCHC RBC-ENTMCNC: 33.2 GM/DL — SIGNIFICANT CHANGE UP (ref 32–36)
MCV RBC AUTO: 86.5 FL — SIGNIFICANT CHANGE UP (ref 80–100)
PHOSPHATE SERPL-MCNC: 1.5 MG/DL — LOW (ref 2.5–4.5)
PLATELET # BLD AUTO: 266 K/UL — SIGNIFICANT CHANGE UP (ref 150–400)
POTASSIUM SERPL-MCNC: 3.4 MMOL/L — LOW (ref 3.5–5.3)
POTASSIUM SERPL-SCNC: 3.4 MMOL/L — LOW (ref 3.5–5.3)
RBC # BLD: 4.47 M/UL — SIGNIFICANT CHANGE UP (ref 4.2–5.8)
RBC # FLD: 12.3 % — SIGNIFICANT CHANGE UP (ref 10.3–14.5)
SODIUM SERPL-SCNC: 138 MMOL/L — SIGNIFICANT CHANGE UP (ref 135–145)
WBC # BLD: 8.3 K/UL — SIGNIFICANT CHANGE UP (ref 3.8–10.5)
WBC # FLD AUTO: 8.3 K/UL — SIGNIFICANT CHANGE UP (ref 3.8–10.5)

## 2019-01-29 PROCEDURE — 99232 SBSQ HOSP IP/OBS MODERATE 35: CPT

## 2019-01-29 PROCEDURE — 74018 RADEX ABDOMEN 1 VIEW: CPT | Mod: 26

## 2019-01-29 RX ORDER — POTASSIUM PHOSPHATE, MONOBASIC POTASSIUM PHOSPHATE, DIBASIC 236; 224 MG/ML; MG/ML
30 INJECTION, SOLUTION INTRAVENOUS ONCE
Qty: 0 | Refills: 0 | Status: COMPLETED | OUTPATIENT
Start: 2019-01-29 | End: 2019-01-29

## 2019-01-29 RX ADMIN — Medication 25 MICROGRAM(S): at 08:43

## 2019-01-29 RX ADMIN — Medication 2.5 MILLIGRAM(S): at 18:40

## 2019-01-29 RX ADMIN — DEXTROSE MONOHYDRATE, SODIUM CHLORIDE, AND POTASSIUM CHLORIDE 125 MILLILITER(S): 50; .745; 4.5 INJECTION, SOLUTION INTRAVENOUS at 11:59

## 2019-01-29 RX ADMIN — POTASSIUM PHOSPHATE, MONOBASIC POTASSIUM PHOSPHATE, DIBASIC 85 MILLIMOLE(S): 236; 224 INJECTION, SOLUTION INTRAVENOUS at 11:59

## 2019-01-29 RX ADMIN — HEPARIN SODIUM 5000 UNIT(S): 5000 INJECTION INTRAVENOUS; SUBCUTANEOUS at 06:44

## 2019-01-29 RX ADMIN — DEXTROSE MONOHYDRATE, SODIUM CHLORIDE, AND POTASSIUM CHLORIDE 125 MILLILITER(S): 50; .745; 4.5 INJECTION, SOLUTION INTRAVENOUS at 23:33

## 2019-01-29 RX ADMIN — Medication 2.5 MILLIGRAM(S): at 12:00

## 2019-01-29 RX ADMIN — HEPARIN SODIUM 5000 UNIT(S): 5000 INJECTION INTRAVENOUS; SUBCUTANEOUS at 13:03

## 2019-01-29 RX ADMIN — HEPARIN SODIUM 5000 UNIT(S): 5000 INJECTION INTRAVENOUS; SUBCUTANEOUS at 23:37

## 2019-01-29 RX ADMIN — Medication 2.5 MILLIGRAM(S): at 06:45

## 2019-01-29 NOTE — PROGRESS NOTE ADULT - ATTENDING COMMENTS
As above  Pt with SBO    Afebrile  comfortable    Abd  softly distended, no tenderness, denies Flatus      WBC wnl,   ml    Imp  SBO, clinically not resolved  Plan  Obstructive series, continue NGT

## 2019-01-29 NOTE — PROGRESS NOTE ADULT - SUBJECTIVE AND OBJECTIVE BOX
pt seen in icu [  ], reg med floor [ x  ], bed [ x ], chair at bedside [   ]    Awake ,alert ,lying in bed in NAD. Still with NGT in place.    REVIEW OF SYSTEMS:    CONSTITUTIONAL: No weakness, fevers or chills  EYES/ENT: No visual changes;  No vertigo or throat pain   NECK: No pain or stiffness  RESPIRATORY: No cough, wheezing, hemoptysis; No shortness of breath  CARDIOVASCULAR: No chest pain or palpitations  GASTROINTESTINAL: + abdominal  pain. No nausea, vomiting, or hematemesis; No diarrhea or constipation. No melena or hematochezia.  GENITOURINARY: No dysuria, frequency or hematuria  NEUROLOGICAL: No numbness or weakness  SKIN: No itching, burning, rashes, or lesions   All other review of systems is negative unless indicated above.    Physical Exam    General: WN/WD NAD  Neurology: A&Ox3, nonfocal, TORRES x 4  Respiratory: CTA B/L  CV: RRR, S1S2, no murmurs, rubs or gallops  Abdominal:  mildly tender, still distended but less firm,  +BS, + flatus.   Extremities: No edema, + peripheral pulses      Allergies  No Known Allergies      Health Issues  Intestinal obstruction  SBO (small bowel obstruction)  HTN (hypertension)  Cholelithiasis  History of cholecystectomy      Vitals  T(F): 98.9 (01-28-19 @ 10:20), Max: 98.9 (01-28-19 @ 10:20)  HR: 97 (01-28-19 @ 10:20) (88 - 98)  BP: 132/61 (01-28-19 @ 10:20) (114/60 - 132/61)  RR: 18 (01-28-19 @ 10:20) (17 - 18)  SpO2: 98% (01-28-19 @ 10:20) (95% - 98%)  Wt(kg): --  CAPILLARY BLOOD GLUCOSE      POCT Blood Glucose.: 232 mg/dL (28 Jan 2019 12:06)      Labs                          13.1   10.4  )-----------( 299      ( 28 Jan 2019 05:40 )             39.4       01-28    137  |  101  |  33<H>  ----------------------------<  117<H>  3.2<L>   |  27  |  1.10    Ca    8.1<L>      28 Jan 2019 05:40    TPro  7.7  /  Alb  3.8  /  TBili  0.9  /  DBili  x   /  AST  27  /  ALT  37  /  AlkPhos  87  01-27        Radiology Results      < from: CT Abdomen and Pelvis w/ Oral Cont and w/ IV Cont (01.27.19 @ 19:22) >  IMPRESSION:   1. Multiple very distended loops of small bowel measuring up to 4.5 cm   consistent with high-grade small bowel obstruction. Transition point is   seen in   right mesentery on axial series 2 images 65-71.   2. Stones in the gallbladder. No gallbladder wall thickening or   pericholecystic   fluid.   3. Enlarged prostate measuring 7.1 x 5.3 cm.    < end of copied text >      Meds    MEDICATIONS  (STANDING):  dextrose 5% + sodium chloride 0.45% with potassium chloride 20 mEq/L 1000 milliLiter(s) (125 mL/Hr) IV Continuous <Continuous>  heparin  Injectable 5000 Unit(s) SubCutaneous every 8 hours  insulin lispro (HumaLOG) corrective regimen sliding scale   SubCutaneous every 6 hours  levothyroxine Injectable 25 MICROGram(s) IV Push <User Schedule>  metoprolol tartrate Injectable 2.5 milliGRAM(s) IV Push every 6 hours      MEDICATIONS  (PRN):  ondansetron Injectable 4 milliGRAM(s) IV Push every 6 hours PRN Nausea and/or Vomiting pt seen in icu [  ], reg med floor [ x  ], bed [ x ], chair at bedside [   ]    Awake ,alert ,lying in bed in NAD. Still with NGT in place. Passing flatus but no BM as yet    REVIEW OF SYSTEMS:    CONSTITUTIONAL: No weakness, fevers or chills  EYES/ENT: No visual changes;  No vertigo or throat pain   NECK: No pain or stiffness  RESPIRATORY: No cough, wheezing, hemoptysis; No shortness of breath  CARDIOVASCULAR: No chest pain or palpitations  GASTROINTESTINAL: + abdominal  pain. No nausea, vomiting, or hematemesis; No diarrhea or constipation. No melena or hematochezia.  GENITOURINARY: No dysuria, frequency or hematuria  NEUROLOGICAL: No numbness or weakness  SKIN: No itching, burning, rashes, or lesions   All other review of systems is negative unless indicated above.    Physical Exam    General: WN/WD NAD  Neurology: A&Ox3, nonfocal, TORRES x 4  Respiratory: CTA B/L  CV: RRR, S1S2, no murmurs, rubs or gallops  Abdominal:  mildly tender, still distended but less firm,  +BS, + flatus.   Extremities: No edema, + peripheral pulses      Allergies  No Known Allergies      Health Issues  Intestinal obstruction  SBO (small bowel obstruction)  HTN (hypertension)  Cholelithiasis  History of cholecystectomy      Vitals  T(F): 98.9 (01-28-19 @ 10:20), Max: 98.9 (01-28-19 @ 10:20)  HR: 97 (01-28-19 @ 10:20) (88 - 98)  BP: 132/61 (01-28-19 @ 10:20) (114/60 - 132/61)  RR: 18 (01-28-19 @ 10:20) (17 - 18)  SpO2: 98% (01-28-19 @ 10:20) (95% - 98%)  Wt(kg): --  CAPILLARY BLOOD GLUCOSE      POCT Blood Glucose.: 232 mg/dL (28 Jan 2019 12:06)      Labs                          13.1   10.4  )-----------( 299      ( 28 Jan 2019 05:40 )             39.4       01-28    137  |  101  |  33<H>  ----------------------------<  117<H>  3.2<L>   |  27  |  1.10    Ca    8.1<L>      28 Jan 2019 05:40    TPro  7.7  /  Alb  3.8  /  TBili  0.9  /  DBili  x   /  AST  27  /  ALT  37  /  AlkPhos  87  01-27        Radiology Results      < from: CT Abdomen and Pelvis w/ Oral Cont and w/ IV Cont (01.27.19 @ 19:22) >  IMPRESSION:   1. Multiple very distended loops of small bowel measuring up to 4.5 cm   consistent with high-grade small bowel obstruction. Transition point is   seen in   right mesentery on axial series 2 images 65-71.   2. Stones in the gallbladder. No gallbladder wall thickening or   pericholecystic   fluid.   3. Enlarged prostate measuring 7.1 x 5.3 cm.    < end of copied text >      Meds    MEDICATIONS  (STANDING):  dextrose 5% + sodium chloride 0.45% with potassium chloride 20 mEq/L 1000 milliLiter(s) (125 mL/Hr) IV Continuous <Continuous>  heparin  Injectable 5000 Unit(s) SubCutaneous every 8 hours  insulin lispro (HumaLOG) corrective regimen sliding scale   SubCutaneous every 6 hours  levothyroxine Injectable 25 MICROGram(s) IV Push <User Schedule>  metoprolol tartrate Injectable 2.5 milliGRAM(s) IV Push every 6 hours      MEDICATIONS  (PRN):  ondansetron Injectable 4 milliGRAM(s) IV Push every 6 hours PRN Nausea and/or Vomiting

## 2019-01-29 NOTE — PROGRESS NOTE ADULT - PROBLEM SELECTOR PLAN 1
+ Flatus, feeling better.   + BS, abdomen less firm still w/ distention.   IVF  NPO  NGT suction.  Replace lytes

## 2019-01-29 NOTE — PROGRESS NOTE ADULT - SUBJECTIVE AND OBJECTIVE BOX
Called by floor RN to inform that NGT has curled up in patient's mouth.  Pt was examined at bedside. NAD  Pt states he had BM this afternoon and denies N/V.   Pt was told the NGT needs to be re-inserted but patient refuses re-insertion of NGT.  Pt was explained the risks for aspiration, persistent abdominal distention. Pt states he will re-consider NGT re-insertion if he feels nauseous or vomits.

## 2019-01-29 NOTE — PROGRESS NOTE ADULT - SUBJECTIVE AND OBJECTIVE BOX
73y Male with no overnight complaints  NGT to lws  -flatus/bm    Vital Signs:  T(C): 37.3 (01-29-19 @ 06:00), Max: 37.3 (01-29-19 @ 06:00)  HR: 86 (01-29-19 @ 06:00) (83 - 98)  BP: 156/74 (01-29-19 @ 06:00) (122/60 - 156/74)  RR: 16 (01-29-19 @ 06:00) (16 - 18)  SpO2: 95% (01-29-19 @ 06:00) (95% - 98%)  Wt(kg): --    Physical Exam:  General: NAD, comfortable  Abdomen: softly distended. nontender                          12.8   8.3   )-----------( 266      ( 29 Jan 2019 07:48 )             38.7   01-29    138  |  103  |  12  ----------------------------<  151<H>  3.4<L>   |  28  |  0.84    Ca    7.8<L>      29 Jan 2019 07:48  Phos  1.5     01-29  Mg     2.3     01-29    TPro  7.7  /  Alb  3.8  /  TBili  0.9  /  DBili  x   /  AST  27  /  ALT  37  /  AlkPhos  87  01-27

## 2019-01-30 LAB
ANION GAP SERPL CALC-SCNC: 7 MMOL/L — SIGNIFICANT CHANGE UP (ref 5–17)
BASOPHILS # BLD AUTO: 0.1 K/UL — SIGNIFICANT CHANGE UP (ref 0–0.2)
BASOPHILS NFR BLD AUTO: 0.8 % — SIGNIFICANT CHANGE UP (ref 0–2)
BUN SERPL-MCNC: 10 MG/DL — SIGNIFICANT CHANGE UP (ref 7–18)
CALCIUM SERPL-MCNC: 7.6 MG/DL — LOW (ref 8.4–10.5)
CHLORIDE SERPL-SCNC: 106 MMOL/L — SIGNIFICANT CHANGE UP (ref 96–108)
CO2 SERPL-SCNC: 28 MMOL/L — SIGNIFICANT CHANGE UP (ref 22–31)
CREAT SERPL-MCNC: 0.84 MG/DL — SIGNIFICANT CHANGE UP (ref 0.5–1.3)
EOSINOPHIL # BLD AUTO: 0.1 K/UL — SIGNIFICANT CHANGE UP (ref 0–0.5)
EOSINOPHIL NFR BLD AUTO: 1.8 % — SIGNIFICANT CHANGE UP (ref 0–6)
GLUCOSE BLDC GLUCOMTR-MCNC: 120 MG/DL — HIGH (ref 70–99)
GLUCOSE BLDC GLUCOMTR-MCNC: 129 MG/DL — HIGH (ref 70–99)
GLUCOSE BLDC GLUCOMTR-MCNC: 130 MG/DL — HIGH (ref 70–99)
GLUCOSE BLDC GLUCOMTR-MCNC: 134 MG/DL — HIGH (ref 70–99)
GLUCOSE SERPL-MCNC: 110 MG/DL — HIGH (ref 70–99)
HCT VFR BLD CALC: 36.5 % — LOW (ref 39–50)
HGB BLD-MCNC: 12.2 G/DL — LOW (ref 13–17)
LYMPHOCYTES # BLD AUTO: 1.8 K/UL — SIGNIFICANT CHANGE UP (ref 1–3.3)
LYMPHOCYTES # BLD AUTO: 22.9 % — SIGNIFICANT CHANGE UP (ref 13–44)
MCHC RBC-ENTMCNC: 29.3 PG — SIGNIFICANT CHANGE UP (ref 27–34)
MCHC RBC-ENTMCNC: 33.3 GM/DL — SIGNIFICANT CHANGE UP (ref 32–36)
MCV RBC AUTO: 88 FL — SIGNIFICANT CHANGE UP (ref 80–100)
MONOCYTES # BLD AUTO: 0.7 K/UL — SIGNIFICANT CHANGE UP (ref 0–0.9)
MONOCYTES NFR BLD AUTO: 9.3 % — SIGNIFICANT CHANGE UP (ref 2–14)
NEUTROPHILS # BLD AUTO: 5.1 K/UL — SIGNIFICANT CHANGE UP (ref 1.8–7.4)
NEUTROPHILS NFR BLD AUTO: 65.1 % — SIGNIFICANT CHANGE UP (ref 43–77)
PHOSPHATE SERPL-MCNC: 1.8 MG/DL — LOW (ref 2.5–4.5)
PLATELET # BLD AUTO: 244 K/UL — SIGNIFICANT CHANGE UP (ref 150–400)
POTASSIUM SERPL-MCNC: 3.5 MMOL/L — SIGNIFICANT CHANGE UP (ref 3.5–5.3)
POTASSIUM SERPL-SCNC: 3.5 MMOL/L — SIGNIFICANT CHANGE UP (ref 3.5–5.3)
RBC # BLD: 4.15 M/UL — LOW (ref 4.2–5.8)
RBC # FLD: 12 % — SIGNIFICANT CHANGE UP (ref 10.3–14.5)
SODIUM SERPL-SCNC: 141 MMOL/L — SIGNIFICANT CHANGE UP (ref 135–145)
WBC # BLD: 7.9 K/UL — SIGNIFICANT CHANGE UP (ref 3.8–10.5)
WBC # FLD AUTO: 7.9 K/UL — SIGNIFICANT CHANGE UP (ref 3.8–10.5)

## 2019-01-30 PROCEDURE — 99232 SBSQ HOSP IP/OBS MODERATE 35: CPT

## 2019-01-30 PROCEDURE — 74019 RADEX ABDOMEN 2 VIEWS: CPT | Mod: 26

## 2019-01-30 RX ORDER — POTASSIUM PHOSPHATE, MONOBASIC POTASSIUM PHOSPHATE, DIBASIC 236; 224 MG/ML; MG/ML
15 INJECTION, SOLUTION INTRAVENOUS ONCE
Qty: 0 | Refills: 0 | Status: COMPLETED | OUTPATIENT
Start: 2019-01-30 | End: 2019-01-30

## 2019-01-30 RX ORDER — CARVEDILOL PHOSPHATE 80 MG/1
6.25 CAPSULE, EXTENDED RELEASE ORAL EVERY 12 HOURS
Qty: 0 | Refills: 0 | Status: DISCONTINUED | OUTPATIENT
Start: 2019-01-30 | End: 2019-01-31

## 2019-01-30 RX ORDER — NIFEDIPINE 30 MG
90 TABLET, EXTENDED RELEASE 24 HR ORAL DAILY
Qty: 0 | Refills: 0 | Status: DISCONTINUED | OUTPATIENT
Start: 2019-01-30 | End: 2019-01-31

## 2019-01-30 RX ORDER — LEVOTHYROXINE SODIUM 125 MCG
50 TABLET ORAL DAILY
Qty: 0 | Refills: 0 | Status: DISCONTINUED | OUTPATIENT
Start: 2019-01-30 | End: 2019-01-31

## 2019-01-30 RX ADMIN — HEPARIN SODIUM 5000 UNIT(S): 5000 INJECTION INTRAVENOUS; SUBCUTANEOUS at 13:53

## 2019-01-30 RX ADMIN — Medication 2.5 MILLIGRAM(S): at 00:59

## 2019-01-30 RX ADMIN — Medication 25 MICROGRAM(S): at 06:21

## 2019-01-30 RX ADMIN — POTASSIUM PHOSPHATE, MONOBASIC POTASSIUM PHOSPHATE, DIBASIC 62.5 MILLIMOLE(S): 236; 224 INJECTION, SOLUTION INTRAVENOUS at 12:20

## 2019-01-30 RX ADMIN — Medication 2.5 MILLIGRAM(S): at 06:18

## 2019-01-30 RX ADMIN — Medication 2.5 MILLIGRAM(S): at 12:14

## 2019-01-30 RX ADMIN — HEPARIN SODIUM 5000 UNIT(S): 5000 INJECTION INTRAVENOUS; SUBCUTANEOUS at 06:18

## 2019-01-30 RX ADMIN — DEXTROSE MONOHYDRATE, SODIUM CHLORIDE, AND POTASSIUM CHLORIDE 125 MILLILITER(S): 50; .745; 4.5 INJECTION, SOLUTION INTRAVENOUS at 12:20

## 2019-01-30 NOTE — PROGRESS NOTE ADULT - SUBJECTIVE AND OBJECTIVE BOX
Afebrile, comfortable    Abd  soft, less distended, + BM     Labs OK    Imp  SBO, resolved clinically  Plan  Repeat obstructive series, if OK, begin diet

## 2019-01-30 NOTE — PROGRESS NOTE ADULT - SUBJECTIVE AND OBJECTIVE BOX
Patient is a 73y old  Male who presents with a chief complaint of sbo (30 Jan 2019 09:50)    pt seen in icu [  ], reg med floor [ x  ], bed [ x ], chair at bedside [   ], a+o x3 [ x ], lethargic [  ],  nad [ x ]    pt passing gas but no bm as yet      Allergies    No Known Allergies        Vitals    T(F): 97.8 (01-30-19 @ 06:17), Max: 99.3 (01-29-19 @ 21:39)  HR: 65 (01-30-19 @ 06:17) (60 - 86)  BP: 124/71 (01-30-19 @ 06:17) (124/71 - 152/76)  RR: 17 (01-30-19 @ 06:17) (16 - 17)  SpO2: 98% (01-30-19 @ 06:17) (96% - 98%)  Wt(kg): --  CAPILLARY BLOOD GLUCOSE      POCT Blood Glucose.: 120 mg/dL (30 Jan 2019 11:36)      Labs                          12.2   7.9   )-----------( 244      ( 30 Jan 2019 07:46 )             36.5       01-30    141  |  106  |  10  ----------------------------<  110<H>  3.5   |  28  |  0.84    Ca    7.6<L>      30 Jan 2019 07:46  Phos  1.8     01-30  Mg     2.3     01-29                  Radiology Results      Meds    MEDICATIONS  (STANDING):  dextrose 5% + sodium chloride 0.45% with potassium chloride 20 mEq/L 1000 milliLiter(s) (125 mL/Hr) IV Continuous <Continuous>  heparin  Injectable 5000 Unit(s) SubCutaneous every 8 hours  insulin lispro (HumaLOG) corrective regimen sliding scale   SubCutaneous every 6 hours  levothyroxine Injectable 25 MICROGram(s) IV Push <User Schedule>  metoprolol tartrate Injectable 2.5 milliGRAM(s) IV Push every 6 hours  potassium phosphate IVPB 15 milliMole(s) IV Intermittent once      MEDICATIONS  (PRN):  ondansetron Injectable 4 milliGRAM(s) IV Push every 6 hours PRN Nausea and/or Vomiting      Physical Exam    Neuro :  no focal deficits  Respiratory: CTA B/L  CV: RRR, S1S2, no murmurs,   Abdominal: Soft, NT, ND +BS,  Extremities: No edema, + peripheral pulses    ASSESSMENT      SBO (small bowel obstruction)  hypophosphatemia  h/o HTN (hypertension)  bph  Cholelithiasis  History of cholecystectomy      PLAN      surg f/u noted  sbo resolved clinically  f/u Repeat abd obstructive series and if OK, begin diet  supplement phos  cont current meds Patient is a 73y old  Male who presents with a chief complaint of sbo (30 Jan 2019 09:50)    pt seen in icu [  ], reg med floor [ x  ], bed [  ], chair at bedside [ x  ], a+o x3 [ x ], lethargic [  ],  nad [ x ]    pt passing gas and having bm       Allergies    No Known Allergies        Vitals    T(F): 97.8 (01-30-19 @ 06:17), Max: 99.3 (01-29-19 @ 21:39)  HR: 65 (01-30-19 @ 06:17) (60 - 86)  BP: 124/71 (01-30-19 @ 06:17) (124/71 - 152/76)  RR: 17 (01-30-19 @ 06:17) (16 - 17)  SpO2: 98% (01-30-19 @ 06:17) (96% - 98%)  Wt(kg): --  CAPILLARY BLOOD GLUCOSE      POCT Blood Glucose.: 120 mg/dL (30 Jan 2019 11:36)      Labs                          12.2   7.9   )-----------( 244      ( 30 Jan 2019 07:46 )             36.5       01-30    141  |  106  |  10  ----------------------------<  110<H>  3.5   |  28  |  0.84    Ca    7.6<L>      30 Jan 2019 07:46  Phos  1.8     01-30  Mg     2.3     01-29                  Radiology Results      Meds    MEDICATIONS  (STANDING):  dextrose 5% + sodium chloride 0.45% with potassium chloride 20 mEq/L 1000 milliLiter(s) (125 mL/Hr) IV Continuous <Continuous>  heparin  Injectable 5000 Unit(s) SubCutaneous every 8 hours  insulin lispro (HumaLOG) corrective regimen sliding scale   SubCutaneous every 6 hours  levothyroxine Injectable 25 MICROGram(s) IV Push <User Schedule>  metoprolol tartrate Injectable 2.5 milliGRAM(s) IV Push every 6 hours  potassium phosphate IVPB 15 milliMole(s) IV Intermittent once      MEDICATIONS  (PRN):  ondansetron Injectable 4 milliGRAM(s) IV Push every 6 hours PRN Nausea and/or Vomiting      Physical Exam    Neuro :  no focal deficits  Respiratory: CTA B/L  CV: RRR, S1S2, no murmurs,   Abdominal: Soft, NT, ND +BS,  Extremities: No edema, + peripheral pulses    ASSESSMENT      SBO (small bowel obstruction)  hypophosphatemia  h/o HTN (hypertension)  bph  Cholelithiasis  History of cholecystectomy      PLAN      surg f/u noted  sbo resolved clinically  f/u Repeat abd obstructive series and if OK, begin diet  supplement phos  cont current meds

## 2019-01-31 ENCOUNTER — TRANSCRIPTION ENCOUNTER (OUTPATIENT)
Age: 74
End: 2019-01-31

## 2019-01-31 ENCOUNTER — INBOUND DOCUMENT (OUTPATIENT)
Age: 74
End: 2019-01-31

## 2019-01-31 VITALS
OXYGEN SATURATION: 98 % | TEMPERATURE: 98 F | RESPIRATION RATE: 17 BRPM | HEART RATE: 80 BPM | DIASTOLIC BLOOD PRESSURE: 58 MMHG | SYSTOLIC BLOOD PRESSURE: 104 MMHG

## 2019-01-31 LAB
ANION GAP SERPL CALC-SCNC: 5 MMOL/L — SIGNIFICANT CHANGE UP (ref 5–17)
BUN SERPL-MCNC: 8 MG/DL — SIGNIFICANT CHANGE UP (ref 7–18)
CALCIUM SERPL-MCNC: 7.8 MG/DL — LOW (ref 8.4–10.5)
CHLORIDE SERPL-SCNC: 107 MMOL/L — SIGNIFICANT CHANGE UP (ref 96–108)
CO2 SERPL-SCNC: 26 MMOL/L — SIGNIFICANT CHANGE UP (ref 22–31)
CREAT SERPL-MCNC: 0.78 MG/DL — SIGNIFICANT CHANGE UP (ref 0.5–1.3)
GLUCOSE BLDC GLUCOMTR-MCNC: 103 MG/DL — HIGH (ref 70–99)
GLUCOSE BLDC GLUCOMTR-MCNC: 120 MG/DL — HIGH (ref 70–99)
GLUCOSE BLDC GLUCOMTR-MCNC: 128 MG/DL — HIGH (ref 70–99)
GLUCOSE BLDC GLUCOMTR-MCNC: 137 MG/DL — HIGH (ref 70–99)
GLUCOSE BLDC GLUCOMTR-MCNC: 91 MG/DL — SIGNIFICANT CHANGE UP (ref 70–99)
GLUCOSE SERPL-MCNC: 111 MG/DL — HIGH (ref 70–99)
HCT VFR BLD CALC: 36.6 % — LOW (ref 39–50)
HGB BLD-MCNC: 12.3 G/DL — LOW (ref 13–17)
MAGNESIUM SERPL-MCNC: 2 MG/DL — SIGNIFICANT CHANGE UP (ref 1.6–2.6)
MCHC RBC-ENTMCNC: 29.4 PG — SIGNIFICANT CHANGE UP (ref 27–34)
MCHC RBC-ENTMCNC: 33.5 GM/DL — SIGNIFICANT CHANGE UP (ref 32–36)
MCV RBC AUTO: 88 FL — SIGNIFICANT CHANGE UP (ref 80–100)
PHOSPHATE SERPL-MCNC: 2.1 MG/DL — LOW (ref 2.5–4.5)
PLATELET # BLD AUTO: 249 K/UL — SIGNIFICANT CHANGE UP (ref 150–400)
POTASSIUM SERPL-MCNC: 3.8 MMOL/L — SIGNIFICANT CHANGE UP (ref 3.5–5.3)
POTASSIUM SERPL-SCNC: 3.8 MMOL/L — SIGNIFICANT CHANGE UP (ref 3.5–5.3)
RBC # BLD: 4.16 M/UL — LOW (ref 4.2–5.8)
RBC # FLD: 12.1 % — SIGNIFICANT CHANGE UP (ref 10.3–14.5)
SODIUM SERPL-SCNC: 138 MMOL/L — SIGNIFICANT CHANGE UP (ref 135–145)
WBC # BLD: 6.2 K/UL — SIGNIFICANT CHANGE UP (ref 3.8–10.5)
WBC # FLD AUTO: 6.2 K/UL — SIGNIFICANT CHANGE UP (ref 3.8–10.5)

## 2019-01-31 PROCEDURE — 99232 SBSQ HOSP IP/OBS MODERATE 35: CPT

## 2019-01-31 PROCEDURE — 74018 RADEX ABDOMEN 1 VIEW: CPT | Mod: 26

## 2019-01-31 RX ORDER — LEVOTHYROXINE SODIUM 125 MCG
1 TABLET ORAL
Qty: 0 | Refills: 0 | COMMUNITY
Start: 2019-01-31

## 2019-01-31 RX ADMIN — HEPARIN SODIUM 5000 UNIT(S): 5000 INJECTION INTRAVENOUS; SUBCUTANEOUS at 06:09

## 2019-01-31 RX ADMIN — Medication 90 MILLIGRAM(S): at 06:09

## 2019-01-31 RX ADMIN — CARVEDILOL PHOSPHATE 6.25 MILLIGRAM(S): 80 CAPSULE, EXTENDED RELEASE ORAL at 18:32

## 2019-01-31 RX ADMIN — HEPARIN SODIUM 5000 UNIT(S): 5000 INJECTION INTRAVENOUS; SUBCUTANEOUS at 14:46

## 2019-01-31 RX ADMIN — CARVEDILOL PHOSPHATE 6.25 MILLIGRAM(S): 80 CAPSULE, EXTENDED RELEASE ORAL at 12:01

## 2019-01-31 RX ADMIN — Medication 50 MICROGRAM(S): at 06:09

## 2019-01-31 RX ADMIN — DEXTROSE MONOHYDRATE, SODIUM CHLORIDE, AND POTASSIUM CHLORIDE 125 MILLILITER(S): 50; .745; 4.5 INJECTION, SOLUTION INTRAVENOUS at 00:37

## 2019-01-31 RX ADMIN — CARVEDILOL PHOSPHATE 6.25 MILLIGRAM(S): 80 CAPSULE, EXTENDED RELEASE ORAL at 00:40

## 2019-01-31 RX ADMIN — HEPARIN SODIUM 5000 UNIT(S): 5000 INJECTION INTRAVENOUS; SUBCUTANEOUS at 00:37

## 2019-01-31 NOTE — PROGRESS NOTE ADULT - SUBJECTIVE AND OBJECTIVE BOX
73y Male with no overnight complaints, + flatus and bm, tolerating diet, denies n/v     Vital Signs Last 24 Hrs  T(C): 36.7 (31 Jan 2019 08:55), Max: 36.9 (30 Jan 2019 14:22)  T(F): 98.1 (31 Jan 2019 08:55), Max: 98.4 (30 Jan 2019 14:22)  HR: 70 (31 Jan 2019 08:55) (62 - 78)  BP: 142/73 (31 Jan 2019 08:55) (126/74 - 159/89)  BP(mean): --  RR: 16 (31 Jan 2019 08:55) (16 - 18)  SpO2: 99% (31 Jan 2019 08:55) (99% - 100%)      Physical Exam:  General: NAD, comfortable  Abdomen: softly distended. nontender                                               12.3   6.2   )-----------( 249      ( 31 Jan 2019 06:06 )             36.6   01-31    138  |  107  |  8   ----------------------------<  111<H>  3.8   |  26  |  0.78    Ca    7.8<L>      31 Jan 2019 06:06  Phos  2.1     01-31  Mg     2.0     01-31

## 2019-01-31 NOTE — DISCHARGE NOTE ADULT - CARE PROVIDER_API CALL
Fred Pleitez (MD)  Surgery  [address], [place] [address]  Phone: [phone]  Fax: [phone]

## 2019-01-31 NOTE — DISCHARGE NOTE ADULT - HOSPITAL COURSE
73 M, PMhx of HTN, HLD, Hypothyroidism, SBO (x 3, managed medically), PShx of exploratory surgery in 2006 per pt, p/w recurrent abdominal pain associated with vomiting since last 2 days. Pain started on Friday night, last BM yesterday. Currently reports slight improvement in pain  no f/c  denies flatus  currently no abd pain  Patient was admitted with SBO, was conservatively managed. Patient clinically improved   Diet was advanced and tolerated   Patient for d/c home

## 2019-01-31 NOTE — PROGRESS NOTE ADULT - SUBJECTIVE AND OBJECTIVE BOX
Patient is a 73y old  Male who presents with a chief complaint of sbo (31 Jan 2019 07:12)    pt seen in icu [  ], reg med floor [ x  ], bed [  ], chair at bedside [ x  ], a+o x3 [ x ], lethargic [  ],  nad [ x ]    pt still passing gas and having bm       Allergies    No Known Allergies        Vitals    T(F): 98.3 (01-31-19 @ 05:59), Max: 98.4 (01-30-19 @ 14:22)  HR: 62 (01-31-19 @ 05:59) (62 - 78)  BP: 134/67 (01-31-19 @ 05:59) (126/74 - 159/89)  RR: 18 (01-31-19 @ 05:59) (17 - 18)  SpO2: 99% (01-31-19 @ 05:59) (99% - 100%)  Wt(kg): --  CAPILLARY BLOOD GLUCOSE      POCT Blood Glucose.: 137 mg/dL (31 Jan 2019 08:22)      Labs                          12.3   6.2   )-----------( 249      ( 31 Jan 2019 06:06 )             36.6       01-31    138  |  107  |  8   ----------------------------<  111<H>  3.8   |  26  |  0.78    Ca    7.8<L>      31 Jan 2019 06:06  Phos  2.1     01-31  Mg     2.0     01-31        Radiology Results      < from: Xray Abdomen 2 Views (01.30.19 @ 12:00) >  IMPRESSION:   Progression of partial SBO.    < end of copied text >        Meds    MEDICATIONS  (STANDING):  carvedilol 6.25 milliGRAM(s) Oral every 12 hours  dextrose 5% + sodium chloride 0.45% with potassium chloride 20 mEq/L 1000 milliLiter(s) (125 mL/Hr) IV Continuous <Continuous>  heparin  Injectable 5000 Unit(s) SubCutaneous every 8 hours  insulin lispro (HumaLOG) corrective regimen sliding scale   SubCutaneous every 6 hours  levothyroxine 50 MICROGram(s) Oral daily  NIFEdipine XL 90 milliGRAM(s) Oral daily      MEDICATIONS  (PRN):  ondansetron Injectable 4 milliGRAM(s) IV Push every 6 hours PRN Nausea and/or Vomiting      Physical Exam    Neuro :  no focal deficits  Respiratory: CTA B/L  CV: RRR, S1S2, no murmurs,   Abdominal: Soft, NT, ND +BS,  Extremities: No edema, + peripheral pulses    ASSESSMENT      SBO (small bowel obstruction)  hypophosphatemia  h/o HTN (hypertension)  bph  Cholelithiasis  History of cholecystectomy      PLAN      surg f/u   sbo resolved clinically  Repeat abd obstructive series with progression of sbo noted above  pt tolerating clears  adv to full diet as tolerated  cont current meds  d/c plan as per surgery

## 2019-01-31 NOTE — DISCHARGE NOTE ADULT - MEDICATION SUMMARY - MEDICATIONS TO TAKE
I will START or STAY ON the medications listed below when I get home from the hospital:    Flomax 0.4 mg oral capsule  -- 1 cap(s) by mouth once a day  -- Indication: For BPH (benign prostatic hyperplasia)    hydrochlorothiazide-losartan 25 mg-100 mg oral tablet  -- 1 tab(s) by mouth once a day  -- Indication: For HTN (hypertension)    carvedilol 6.25 mg oral tablet  -- 1 tab(s) by mouth once a day  -- Indication: For HTN (hypertension)    NIFEdipine 90 mg oral tablet, extended release  -- 1 tab(s) by mouth once a day  -- Indication: For HTN (hypertension)    levothyroxine 50 mcg (0.05 mg) oral tablet  -- 1 tab(s) by mouth once a day  -- Indication: For Hypothyroid

## 2019-01-31 NOTE — DISCHARGE NOTE ADULT - PATIENT PORTAL LINK FT
You can access the [facility] Patient Portal, offered by [facility], by registering with the following website: [email or web address]

## 2019-01-31 NOTE — DISCHARGE NOTE ADULT - CARE PLAN
Principal Discharge DX:	SBO (small bowel obstruction)  Goal:	Resolved  Assessment and plan of treatment:	Diet as tolerated   Activity as tolerated  Secondary Diagnosis:	BPH (benign prostatic hyperplasia)  Goal:	continue current care and follow up with PCP  Secondary Diagnosis:	Hypothyroid  Goal:	continue current care and follow up with PCP  Secondary Diagnosis:	HTN (hypertension)  Goal:	continue current care and follow up with PCP

## 2019-02-01 PROCEDURE — 83690 ASSAY OF LIPASE: CPT

## 2019-02-01 PROCEDURE — 83735 ASSAY OF MAGNESIUM: CPT

## 2019-02-01 PROCEDURE — 84100 ASSAY OF PHOSPHORUS: CPT

## 2019-02-01 PROCEDURE — 83605 ASSAY OF LACTIC ACID: CPT

## 2019-02-01 PROCEDURE — 80048 BASIC METABOLIC PNL TOTAL CA: CPT

## 2019-02-01 PROCEDURE — 86900 BLOOD TYPING SEROLOGIC ABO: CPT

## 2019-02-01 PROCEDURE — 96374 THER/PROPH/DIAG INJ IV PUSH: CPT

## 2019-02-01 PROCEDURE — 85730 THROMBOPLASTIN TIME PARTIAL: CPT

## 2019-02-01 PROCEDURE — 74177 CT ABD & PELVIS W/CONTRAST: CPT

## 2019-02-01 PROCEDURE — 36415 COLL VENOUS BLD VENIPUNCTURE: CPT

## 2019-02-01 PROCEDURE — 86850 RBC ANTIBODY SCREEN: CPT

## 2019-02-01 PROCEDURE — 99285 EMERGENCY DEPT VISIT HI MDM: CPT | Mod: 25

## 2019-02-01 PROCEDURE — 74019 RADEX ABDOMEN 2 VIEWS: CPT

## 2019-02-01 PROCEDURE — 85027 COMPLETE CBC AUTOMATED: CPT

## 2019-02-01 PROCEDURE — 80053 COMPREHEN METABOLIC PANEL: CPT

## 2019-02-01 PROCEDURE — 96375 TX/PRO/DX INJ NEW DRUG ADDON: CPT

## 2019-02-01 PROCEDURE — 85610 PROTHROMBIN TIME: CPT

## 2019-02-01 PROCEDURE — 81001 URINALYSIS AUTO W/SCOPE: CPT

## 2019-02-01 PROCEDURE — 86901 BLOOD TYPING SEROLOGIC RH(D): CPT

## 2019-02-01 PROCEDURE — 82962 GLUCOSE BLOOD TEST: CPT

## 2019-02-01 PROCEDURE — 74018 RADEX ABDOMEN 1 VIEW: CPT

## 2019-02-03 LAB — GLUCOSE BLDC GLUCOMTR-MCNC: 117 MG/DL — HIGH (ref 70–99)

## 2019-06-08 ENCOUNTER — INPATIENT (INPATIENT)
Facility: HOSPITAL | Age: 74
LOS: 2 days | Discharge: HOME | DRG: 390 | End: 2019-06-11
Attending: SURGERY | Admitting: SURGERY
Payer: MEDICARE

## 2019-06-08 VITALS
HEIGHT: 65 IN | OXYGEN SATURATION: 97 % | SYSTOLIC BLOOD PRESSURE: 115 MMHG | WEIGHT: 194.01 LBS | DIASTOLIC BLOOD PRESSURE: 74 MMHG | RESPIRATION RATE: 16 BRPM | HEART RATE: 96 BPM | TEMPERATURE: 98 F

## 2019-06-08 DIAGNOSIS — K56.609 UNSPECIFIED INTESTINAL OBSTRUCTION, UNSPECIFIED AS TO PARTIAL VERSUS COMPLETE OBSTRUCTION: ICD-10-CM

## 2019-06-08 LAB
ALBUMIN SERPL ELPH-MCNC: 3.9 G/DL — SIGNIFICANT CHANGE UP (ref 3.5–5)
ALP SERPL-CCNC: 91 U/L — SIGNIFICANT CHANGE UP (ref 40–120)
ALT FLD-CCNC: 52 U/L DA — SIGNIFICANT CHANGE UP (ref 10–60)
ANION GAP SERPL CALC-SCNC: 11 MMOL/L — SIGNIFICANT CHANGE UP (ref 5–17)
APPEARANCE UR: CLEAR — SIGNIFICANT CHANGE UP
AST SERPL-CCNC: 42 U/L — HIGH (ref 10–40)
BASOPHILS # BLD AUTO: 0.03 K/UL — SIGNIFICANT CHANGE UP (ref 0–0.2)
BASOPHILS NFR BLD AUTO: 0.2 % — SIGNIFICANT CHANGE UP (ref 0–2)
BILIRUB SERPL-MCNC: 0.8 MG/DL — SIGNIFICANT CHANGE UP (ref 0.2–1.2)
BILIRUB UR-MCNC: ABNORMAL
BUN SERPL-MCNC: 33 MG/DL — HIGH (ref 7–18)
CALCIUM SERPL-MCNC: 8.8 MG/DL — SIGNIFICANT CHANGE UP (ref 8.4–10.5)
CHLORIDE SERPL-SCNC: 102 MMOL/L — SIGNIFICANT CHANGE UP (ref 96–108)
CO2 SERPL-SCNC: 25 MMOL/L — SIGNIFICANT CHANGE UP (ref 22–31)
COLOR SPEC: YELLOW — SIGNIFICANT CHANGE UP
CREAT SERPL-MCNC: 1.2 MG/DL — SIGNIFICANT CHANGE UP (ref 0.5–1.3)
DIFF PNL FLD: NEGATIVE — SIGNIFICANT CHANGE UP
EOSINOPHIL # BLD AUTO: 0.05 K/UL — SIGNIFICANT CHANGE UP (ref 0–0.5)
EOSINOPHIL NFR BLD AUTO: 0.4 % — SIGNIFICANT CHANGE UP (ref 0–6)
GLUCOSE SERPL-MCNC: 176 MG/DL — HIGH (ref 70–99)
GLUCOSE UR QL: NEGATIVE — SIGNIFICANT CHANGE UP
HCT VFR BLD CALC: 42.3 % — SIGNIFICANT CHANGE UP (ref 39–50)
HGB BLD-MCNC: 14 G/DL — SIGNIFICANT CHANGE UP (ref 13–17)
IMM GRANULOCYTES NFR BLD AUTO: 0.2 % — SIGNIFICANT CHANGE UP (ref 0–1.5)
KETONES UR-MCNC: ABNORMAL
LACTATE SERPL-SCNC: 1.3 MMOL/L — SIGNIFICANT CHANGE UP (ref 0.7–2)
LEUKOCYTE ESTERASE UR-ACNC: ABNORMAL
LIDOCAIN IGE QN: 100 U/L — SIGNIFICANT CHANGE UP (ref 73–393)
LYMPHOCYTES # BLD AUTO: 0.92 K/UL — LOW (ref 1–3.3)
LYMPHOCYTES # BLD AUTO: 7.5 % — LOW (ref 13–44)
MCHC RBC-ENTMCNC: 28.7 PG — SIGNIFICANT CHANGE UP (ref 27–34)
MCHC RBC-ENTMCNC: 33.1 GM/DL — SIGNIFICANT CHANGE UP (ref 32–36)
MCV RBC AUTO: 86.9 FL — SIGNIFICANT CHANGE UP (ref 80–100)
MONOCYTES # BLD AUTO: 0.97 K/UL — HIGH (ref 0–0.9)
MONOCYTES NFR BLD AUTO: 8 % — SIGNIFICANT CHANGE UP (ref 2–14)
NEUTROPHILS # BLD AUTO: 10.19 K/UL — HIGH (ref 1.8–7.4)
NEUTROPHILS NFR BLD AUTO: 83.7 % — HIGH (ref 43–77)
NITRITE UR-MCNC: NEGATIVE — SIGNIFICANT CHANGE UP
NRBC # BLD: 0 /100 WBCS — SIGNIFICANT CHANGE UP (ref 0–0)
PH UR: 5 — SIGNIFICANT CHANGE UP (ref 5–8)
PLATELET # BLD AUTO: 334 K/UL — SIGNIFICANT CHANGE UP (ref 150–400)
POTASSIUM SERPL-MCNC: 3.6 MMOL/L — SIGNIFICANT CHANGE UP (ref 3.5–5.3)
POTASSIUM SERPL-SCNC: 3.6 MMOL/L — SIGNIFICANT CHANGE UP (ref 3.5–5.3)
PROT SERPL-MCNC: 8.1 G/DL — SIGNIFICANT CHANGE UP (ref 6–8.3)
PROT UR-MCNC: 100
RBC # BLD: 4.87 M/UL — SIGNIFICANT CHANGE UP (ref 4.2–5.8)
RBC # FLD: 13.5 % — SIGNIFICANT CHANGE UP (ref 10.3–14.5)
SODIUM SERPL-SCNC: 138 MMOL/L — SIGNIFICANT CHANGE UP (ref 135–145)
SP GR SPEC: 1.02 — SIGNIFICANT CHANGE UP (ref 1.01–1.02)
UROBILINOGEN FLD QL: 4
WBC # BLD: 12.19 K/UL — HIGH (ref 3.8–10.5)
WBC # FLD AUTO: 12.19 K/UL — HIGH (ref 3.8–10.5)

## 2019-06-08 PROCEDURE — 99285 EMERGENCY DEPT VISIT HI MDM: CPT

## 2019-06-08 PROCEDURE — 74177 CT ABD & PELVIS W/CONTRAST: CPT | Mod: 26

## 2019-06-08 RX ORDER — ONDANSETRON 8 MG/1
4 TABLET, FILM COATED ORAL ONCE
Refills: 0 | Status: COMPLETED | OUTPATIENT
Start: 2019-06-08 | End: 2019-06-08

## 2019-06-08 RX ORDER — FAMOTIDINE 10 MG/ML
20 INJECTION INTRAVENOUS
Refills: 0 | Status: DISCONTINUED | OUTPATIENT
Start: 2019-06-08 | End: 2019-06-11

## 2019-06-08 RX ORDER — HEPARIN SODIUM 5000 [USP'U]/ML
5000 INJECTION INTRAVENOUS; SUBCUTANEOUS EVERY 8 HOURS
Refills: 0 | Status: DISCONTINUED | OUTPATIENT
Start: 2019-06-08 | End: 2019-06-11

## 2019-06-08 RX ORDER — LIDOCAINE HCL 20 MG/ML
8 VIAL (ML) INJECTION ONCE
Refills: 0 | Status: COMPLETED | OUTPATIENT
Start: 2019-06-08 | End: 2019-06-09

## 2019-06-08 RX ORDER — SODIUM CHLORIDE 9 MG/ML
1000 INJECTION INTRAMUSCULAR; INTRAVENOUS; SUBCUTANEOUS ONCE
Refills: 0 | Status: COMPLETED | OUTPATIENT
Start: 2019-06-08 | End: 2019-06-08

## 2019-06-08 RX ORDER — IOHEXOL 300 MG/ML
30 INJECTION, SOLUTION INTRAVENOUS ONCE
Refills: 0 | Status: COMPLETED | OUTPATIENT
Start: 2019-06-08 | End: 2019-06-08

## 2019-06-08 RX ORDER — LIDOCAINE HCL 20 MG/ML
4 VIAL (ML) INJECTION ONCE
Refills: 0 | Status: DISCONTINUED | OUTPATIENT
Start: 2019-06-08 | End: 2019-06-08

## 2019-06-08 RX ADMIN — ONDANSETRON 4 MILLIGRAM(S): 8 TABLET, FILM COATED ORAL at 20:56

## 2019-06-08 RX ADMIN — IOHEXOL 30 MILLILITER(S): 300 INJECTION, SOLUTION INTRAVENOUS at 20:20

## 2019-06-08 RX ADMIN — SODIUM CHLORIDE 1000 MILLILITER(S): 9 INJECTION INTRAMUSCULAR; INTRAVENOUS; SUBCUTANEOUS at 20:55

## 2019-06-08 NOTE — H&P ADULT - HISTORY OF PRESENT ILLNESS
73 M, PMhx of HTN, HLD, Hypothyroidism, SBO (x 3, managed medically), PShx of exploratory surgery in 2006 per pt, p/w recurrent abdominal pain associated with vomiting since last 2 days. Pain started on Friday night, last BM yesterday. Currently reports slight improvement in pain  no f/c 73 M, PMhx of HTN, HLD, Hypothyroidism, SBO (x 4, managed medically), PShx of exploratory laparatomy in 2006, p/w recurrent abdominal pain associated with vomiting since last 2 days. Pain crampy, mostly located in midabdomen. Last BM 2 days ago, no flatus now. Pt denies fever/chills, hematemesis, CP, SOB, dizziness, dysuria.  Pt has had multiple admissions in past for Small bowel obstruction; last admission in late January managed with conservative treatment.  CT abd/pel: Small bowel obstruction with TP in right abdomen, mild mesenteric edema  NGT was placed in ED

## 2019-06-08 NOTE — ED PROVIDER NOTE - PROGRESS NOTE DETAILS
(Saige) - CT with SBO  Will place NGT and admit to surgery. Endorsed to surgical house officer / Dr Rodriguez.

## 2019-06-08 NOTE — H&P ADULT - PROBLEM SELECTOR PLAN 1
Admit to Surgery/Dr Meehan  NPO, IVF  NGT to LCWS  FC  I/O's  Serial abdominal exams  GI/DVT prophylaxis Admit to Surgery/Dr Meehan  NPO, IVF  NGT to LCWS  FC  I/O's  Serial abdominal exams  GI/DVT prophylaxis  D/W Dr Meehan

## 2019-06-08 NOTE — ED PROVIDER NOTE - OBJECTIVE STATEMENT
Patient with multiple episodes of SBO in past, last one in february. Here with abdominal distention, vomiting x2 today and small bowel movement yesterday. Feels like bowel obstruction, as per patient.

## 2019-06-08 NOTE — H&P ADULT - NSHPPHYSICALEXAM_GEN_ALL_CORE
ICU Vital Signs Last 24 Hrs  T(C): 36.9 (09 Jun 2019 05:56), Max: 37.1 (09 Jun 2019 01:29)  T(F): 98.4 (09 Jun 2019 05:56), Max: 98.7 (09 Jun 2019 01:29)  HR: 85 (09 Jun 2019 05:56) (82 - 96)  BP: 125/61 (09 Jun 2019 05:56) (108/63 - 125/61)  BP(mean): --  ABP: --  ABP(mean): --  RR: 18 (09 Jun 2019 05:56) (16 - 18)  SpO2: 97% (09 Jun 2019 05:56) (97% - 99%)

## 2019-06-08 NOTE — ED ADULT NURSE NOTE - NSIMPLEMENTINTERV_GEN_ALL_ED
Implemented All Universal Safety Interventions:  [place] to call system. Call bell, personal items and telephone within reach. Instruct patient to call for assistance. Room bathroom lighting operational. Non-slip footwear when patient is off stretcher. Physically safe environment: no spills, clutter or unnecessary equipment. Stretcher in lowest position, wheels locked, appropriate side rails in place.

## 2019-06-08 NOTE — ED ADULT TRIAGE NOTE - CHIEF COMPLAINT QUOTE
c/o abdominal pain,vomitting,bm to a small stool,it look like that I have a blockage because I KNOW THE SYMPTOMS AS PER PATIENT; NO ACUTE RESPIRATORY DISTRESS

## 2019-06-08 NOTE — H&P ADULT - NSHPLABSRESULTS_GEN_ALL_CORE
14.0   12.19 )-----------( 334      ( 08 Jun 2019 20:57 )             42.3   06-08    138  |  102  |  33<H>  ----------------------------<  176<H>  3.6   |  25  |  1.20    Ca    8.8      08 Jun 2019 20:57    TPro  8.1  /  Alb  3.9  /  TBili  0.8  /  DBili  x   /  AST  42<H>  /  ALT  52  /  AlkPhos  91  06-08    Lactate, Blood: 1.3 mmol/L (06.08.19 @ 20:57)    < from: CT Abdomen and Pelvis w/ Oral Cont and w/ IV Cont (06.08.19 @ 22:17) >    EXAM:  CT ABDOMEN AND PELVIS OC IC                            PROCEDURE DATE:  06/08/2019          INTERPRETATION:  CLINICAL INFORMATION: Abdominal pain, evaluate for small   bowel obstruction.    COMPARISON: 1/27/2019    PROCEDURE:   CT of the Abdomen and Pelvis was performed with intravenous contrast.   Intravenous contrast: 90 ml Omnipaque 350. 10 ml discarded.  Oral contrast: positive contrast was administered.  Sagittal and coronal reformats were performed.    FINDINGS:    LOWER CHEST: Within normal limits.    LIVER: Nonspecific punctate calcification in the left hepatic lobe.  BILE DUCTS: Normal caliber.  GALLBLADDER: Cholelithiasis.  SPLEEN: Within normal limits.  PANCREAS: Within normal limits.  ADRENALS: Within normal limits.  KIDNEYS/URETERS: Left renal cysts. Indeterminate mildly complex 1.6 cm   exophytic left renal lesion. No hydronephrosis.    BLADDER: Within normal limits.  REPRODUCTIVE ORGANS: Prostate is enlarged.    BOWEL: Scattered colonic diverticula. No evidence of appendicitis.   Dilated loops of small bowel with transition to decompressed distal ileum   in the right abdomen (2, 60). There is mild associated central mesenteric   edema.  PERITONEUM: No ascites.  VESSELS:  Within normal limits.  RETROPERITONEUM: Nonspecific mildly enlarged retroperitoneal nodes, for   example, a 1.3 aortocaval node (2, 54)  ABDOMINAL WALL: Postsurgical changes in the anterior abdominal wall.  BONES: Degenerative changes. Stable mild to moderate compression   deformity of L4.    IMPRESSION:       1. Small bowel obstruction with transition in the right abdomen. Mild   mesenteric edema.  2. Indeterminate 1.6 cm exophytic left renal lesion. Nonurgent MRI is   recommended for further evaluation.    MABEL BRAND, ATTENDING RADIOLOGIST  This document has been electronically signed. Jun 8 2019 11:05PM

## 2019-06-08 NOTE — H&P ADULT - RESPIRATORY
Breath Sounds equal & clear to percussion & auscultation, no accessory muscle use Will continue Valtrex 1 gram oral every 8 hours   will continue to closely monitor patient for any dissemination of rash   Pain control   careful hydration   monitor serum creatinine

## 2019-06-09 DIAGNOSIS — Z98.890 OTHER SPECIFIED POSTPROCEDURAL STATES: Chronic | ICD-10-CM

## 2019-06-09 DIAGNOSIS — K56.609 UNSPECIFIED INTESTINAL OBSTRUCTION, UNSPECIFIED AS TO PARTIAL VERSUS COMPLETE OBSTRUCTION: ICD-10-CM

## 2019-06-09 PROBLEM — I10 ESSENTIAL (PRIMARY) HYPERTENSION: Chronic | Status: ACTIVE | Noted: 2019-01-27

## 2019-06-09 PROBLEM — K80.20 CALCULUS OF GALLBLADDER WITHOUT CHOLECYSTITIS WITHOUT OBSTRUCTION: Chronic | Status: ACTIVE | Noted: 2019-01-27

## 2019-06-09 LAB
ANION GAP SERPL CALC-SCNC: 7 MMOL/L — SIGNIFICANT CHANGE UP (ref 5–17)
BASOPHILS # BLD AUTO: 0.01 K/UL — SIGNIFICANT CHANGE UP (ref 0–0.2)
BASOPHILS NFR BLD AUTO: 0.1 % — SIGNIFICANT CHANGE UP (ref 0–2)
BUN SERPL-MCNC: 27 MG/DL — HIGH (ref 7–18)
CALCIUM SERPL-MCNC: 7.9 MG/DL — LOW (ref 8.4–10.5)
CHLORIDE SERPL-SCNC: 109 MMOL/L — HIGH (ref 96–108)
CO2 SERPL-SCNC: 25 MMOL/L — SIGNIFICANT CHANGE UP (ref 22–31)
CREAT SERPL-MCNC: 0.82 MG/DL — SIGNIFICANT CHANGE UP (ref 0.5–1.3)
EOSINOPHIL # BLD AUTO: 0.06 K/UL — SIGNIFICANT CHANGE UP (ref 0–0.5)
EOSINOPHIL NFR BLD AUTO: 0.7 % — SIGNIFICANT CHANGE UP (ref 0–6)
GLUCOSE SERPL-MCNC: 123 MG/DL — HIGH (ref 70–99)
HCT VFR BLD CALC: 36.8 % — LOW (ref 39–50)
HGB BLD-MCNC: 12.1 G/DL — LOW (ref 13–17)
IMM GRANULOCYTES NFR BLD AUTO: 0.2 % — SIGNIFICANT CHANGE UP (ref 0–1.5)
LACTATE SERPL-SCNC: 1 MMOL/L — SIGNIFICANT CHANGE UP (ref 0.7–2)
LYMPHOCYTES # BLD AUTO: 1.55 K/UL — SIGNIFICANT CHANGE UP (ref 1–3.3)
LYMPHOCYTES # BLD AUTO: 17.5 % — SIGNIFICANT CHANGE UP (ref 13–44)
MCHC RBC-ENTMCNC: 28.5 PG — SIGNIFICANT CHANGE UP (ref 27–34)
MCHC RBC-ENTMCNC: 32.9 GM/DL — SIGNIFICANT CHANGE UP (ref 32–36)
MCV RBC AUTO: 86.6 FL — SIGNIFICANT CHANGE UP (ref 80–100)
MONOCYTES # BLD AUTO: 0.84 K/UL — SIGNIFICANT CHANGE UP (ref 0–0.9)
MONOCYTES NFR BLD AUTO: 9.5 % — SIGNIFICANT CHANGE UP (ref 2–14)
NEUTROPHILS # BLD AUTO: 6.39 K/UL — SIGNIFICANT CHANGE UP (ref 1.8–7.4)
NEUTROPHILS NFR BLD AUTO: 72 % — SIGNIFICANT CHANGE UP (ref 43–77)
NRBC # BLD: 0 /100 WBCS — SIGNIFICANT CHANGE UP (ref 0–0)
PLATELET # BLD AUTO: 284 K/UL — SIGNIFICANT CHANGE UP (ref 150–400)
POTASSIUM SERPL-MCNC: 3 MMOL/L — LOW (ref 3.5–5.3)
POTASSIUM SERPL-SCNC: 3 MMOL/L — LOW (ref 3.5–5.3)
RBC # BLD: 4.25 M/UL — SIGNIFICANT CHANGE UP (ref 4.2–5.8)
RBC # FLD: 13.7 % — SIGNIFICANT CHANGE UP (ref 10.3–14.5)
SODIUM SERPL-SCNC: 141 MMOL/L — SIGNIFICANT CHANGE UP (ref 135–145)
WBC # BLD: 8.87 K/UL — SIGNIFICANT CHANGE UP (ref 3.8–10.5)
WBC # FLD AUTO: 8.87 K/UL — SIGNIFICANT CHANGE UP (ref 3.8–10.5)

## 2019-06-09 PROCEDURE — 71045 X-RAY EXAM CHEST 1 VIEW: CPT | Mod: 26

## 2019-06-09 PROCEDURE — 74018 RADEX ABDOMEN 1 VIEW: CPT | Mod: 26

## 2019-06-09 RX ORDER — POTASSIUM CHLORIDE 20 MEQ
10 PACKET (EA) ORAL
Refills: 0 | Status: COMPLETED | OUTPATIENT
Start: 2019-06-09 | End: 2019-06-09

## 2019-06-09 RX ORDER — LEVOTHYROXINE SODIUM 125 MCG
25 TABLET ORAL AT BEDTIME
Refills: 0 | Status: DISCONTINUED | OUTPATIENT
Start: 2019-06-09 | End: 2019-06-10

## 2019-06-09 RX ORDER — ACETAMINOPHEN 500 MG
1000 TABLET ORAL ONCE
Refills: 0 | Status: DISCONTINUED | OUTPATIENT
Start: 2019-06-08 | End: 2019-06-11

## 2019-06-09 RX ORDER — DEXTROSE MONOHYDRATE, SODIUM CHLORIDE, AND POTASSIUM CHLORIDE 50; .745; 4.5 G/1000ML; G/1000ML; G/1000ML
1000 INJECTION, SOLUTION INTRAVENOUS
Refills: 0 | Status: DISCONTINUED | OUTPATIENT
Start: 2019-06-08 | End: 2019-06-11

## 2019-06-09 RX ADMIN — FAMOTIDINE 20 MILLIGRAM(S): 10 INJECTION INTRAVENOUS at 17:54

## 2019-06-09 RX ADMIN — HEPARIN SODIUM 5000 UNIT(S): 5000 INJECTION INTRAVENOUS; SUBCUTANEOUS at 06:38

## 2019-06-09 RX ADMIN — HEPARIN SODIUM 5000 UNIT(S): 5000 INJECTION INTRAVENOUS; SUBCUTANEOUS at 21:39

## 2019-06-09 RX ADMIN — HEPARIN SODIUM 5000 UNIT(S): 5000 INJECTION INTRAVENOUS; SUBCUTANEOUS at 13:46

## 2019-06-09 RX ADMIN — Medication 25 MICROGRAM(S): at 21:39

## 2019-06-09 RX ADMIN — Medication 8 MILLILITER(S): at 00:53

## 2019-06-09 RX ADMIN — Medication 100 MILLIEQUIVALENT(S): at 15:00

## 2019-06-09 RX ADMIN — DEXTROSE MONOHYDRATE, SODIUM CHLORIDE, AND POTASSIUM CHLORIDE 125 MILLILITER(S): 50; .745; 4.5 INJECTION, SOLUTION INTRAVENOUS at 16:58

## 2019-06-09 RX ADMIN — DEXTROSE MONOHYDRATE, SODIUM CHLORIDE, AND POTASSIUM CHLORIDE 125 MILLILITER(S): 50; .745; 4.5 INJECTION, SOLUTION INTRAVENOUS at 06:44

## 2019-06-09 RX ADMIN — Medication 100 MILLIEQUIVALENT(S): at 11:52

## 2019-06-09 RX ADMIN — Medication 100 MILLIEQUIVALENT(S): at 13:42

## 2019-06-09 RX ADMIN — FAMOTIDINE 20 MILLIGRAM(S): 10 INJECTION INTRAVENOUS at 06:38

## 2019-06-09 NOTE — PROGRESS NOTE ADULT - ASSESSMENT
73y.o. Male with recurrent SBO    -NGT to LWS  -serial AXR  -OOB ambulate  -observation    Hypokalemia  -IV repletion    Hypothyroidism  -IV synthroid

## 2019-06-10 LAB
ANION GAP SERPL CALC-SCNC: 7 MMOL/L — SIGNIFICANT CHANGE UP (ref 5–17)
BUN SERPL-MCNC: 11 MG/DL — SIGNIFICANT CHANGE UP (ref 7–18)
CALCIUM SERPL-MCNC: 7.7 MG/DL — LOW (ref 8.4–10.5)
CHLORIDE SERPL-SCNC: 109 MMOL/L — HIGH (ref 96–108)
CO2 SERPL-SCNC: 25 MMOL/L — SIGNIFICANT CHANGE UP (ref 22–31)
CREAT SERPL-MCNC: 0.76 MG/DL — SIGNIFICANT CHANGE UP (ref 0.5–1.3)
CULTURE RESULTS: NO GROWTH — SIGNIFICANT CHANGE UP
GLUCOSE SERPL-MCNC: 117 MG/DL — HIGH (ref 70–99)
HCT VFR BLD CALC: 34.1 % — LOW (ref 39–50)
HGB BLD-MCNC: 11.2 G/DL — LOW (ref 13–17)
MAGNESIUM SERPL-MCNC: 2.1 MG/DL — SIGNIFICANT CHANGE UP (ref 1.6–2.6)
MCHC RBC-ENTMCNC: 28.9 PG — SIGNIFICANT CHANGE UP (ref 27–34)
MCHC RBC-ENTMCNC: 32.8 GM/DL — SIGNIFICANT CHANGE UP (ref 32–36)
MCV RBC AUTO: 87.9 FL — SIGNIFICANT CHANGE UP (ref 80–100)
NRBC # BLD: 0 /100 WBCS — SIGNIFICANT CHANGE UP (ref 0–0)
PLATELET # BLD AUTO: 250 K/UL — SIGNIFICANT CHANGE UP (ref 150–400)
POTASSIUM SERPL-MCNC: 3.3 MMOL/L — LOW (ref 3.5–5.3)
POTASSIUM SERPL-SCNC: 3.3 MMOL/L — LOW (ref 3.5–5.3)
RBC # BLD: 3.88 M/UL — LOW (ref 4.2–5.8)
RBC # FLD: 13.7 % — SIGNIFICANT CHANGE UP (ref 10.3–14.5)
SODIUM SERPL-SCNC: 141 MMOL/L — SIGNIFICANT CHANGE UP (ref 135–145)
SPECIMEN SOURCE: SIGNIFICANT CHANGE UP
WBC # BLD: 4.79 K/UL — SIGNIFICANT CHANGE UP (ref 3.8–10.5)
WBC # FLD AUTO: 4.79 K/UL — SIGNIFICANT CHANGE UP (ref 3.8–10.5)

## 2019-06-10 PROCEDURE — 99232 SBSQ HOSP IP/OBS MODERATE 35: CPT

## 2019-06-10 RX ORDER — LEVOTHYROXINE SODIUM 125 MCG
50 TABLET ORAL DAILY
Refills: 0 | Status: DISCONTINUED | OUTPATIENT
Start: 2019-06-10 | End: 2019-06-11

## 2019-06-10 RX ORDER — POTASSIUM CHLORIDE 20 MEQ
20 PACKET (EA) ORAL
Refills: 0 | Status: COMPLETED | OUTPATIENT
Start: 2019-06-10 | End: 2019-06-10

## 2019-06-10 RX ADMIN — HEPARIN SODIUM 5000 UNIT(S): 5000 INJECTION INTRAVENOUS; SUBCUTANEOUS at 11:07

## 2019-06-10 RX ADMIN — HEPARIN SODIUM 5000 UNIT(S): 5000 INJECTION INTRAVENOUS; SUBCUTANEOUS at 05:30

## 2019-06-10 RX ADMIN — FAMOTIDINE 20 MILLIGRAM(S): 10 INJECTION INTRAVENOUS at 17:02

## 2019-06-10 RX ADMIN — HEPARIN SODIUM 5000 UNIT(S): 5000 INJECTION INTRAVENOUS; SUBCUTANEOUS at 21:44

## 2019-06-10 RX ADMIN — FAMOTIDINE 20 MILLIGRAM(S): 10 INJECTION INTRAVENOUS at 05:30

## 2019-06-10 RX ADMIN — Medication 20 MILLIEQUIVALENT(S): at 11:07

## 2019-06-10 NOTE — PROGRESS NOTE ADULT - ATTENDING COMMENTS
Patient seen and examined by me and discussed with the surgical team.   I have reviewed and agree with the documented findings and plan of care, with any exceptions noted.  Patient improved overall, no pain and benign abdominal exam.   Start clear liquids.

## 2019-06-10 NOTE — DIETITIAN INITIAL EVALUATION ADULT. - PROBLEM SELECTOR PLAN 1
Admit to Surgery/Dr Meehan  NPO, IVF  NGT to LCWS  FC  I/O's  Serial abdominal exams  GI/DVT prophylaxis  D/W Dr Meehan

## 2019-06-11 ENCOUNTER — TRANSCRIPTION ENCOUNTER (OUTPATIENT)
Age: 74
End: 2019-06-11

## 2019-06-11 VITALS
TEMPERATURE: 98 F | DIASTOLIC BLOOD PRESSURE: 58 MMHG | SYSTOLIC BLOOD PRESSURE: 145 MMHG | RESPIRATION RATE: 18 BRPM | HEART RATE: 64 BPM | OXYGEN SATURATION: 98 %

## 2019-06-11 PROCEDURE — 94640 AIRWAY INHALATION TREATMENT: CPT

## 2019-06-11 PROCEDURE — 74018 RADEX ABDOMEN 1 VIEW: CPT

## 2019-06-11 PROCEDURE — 81001 URINALYSIS AUTO W/SCOPE: CPT

## 2019-06-11 PROCEDURE — 80053 COMPREHEN METABOLIC PANEL: CPT

## 2019-06-11 PROCEDURE — 36415 COLL VENOUS BLD VENIPUNCTURE: CPT

## 2019-06-11 PROCEDURE — 83735 ASSAY OF MAGNESIUM: CPT

## 2019-06-11 PROCEDURE — 85027 COMPLETE CBC AUTOMATED: CPT

## 2019-06-11 PROCEDURE — 99239 HOSP IP/OBS DSCHRG MGMT >30: CPT

## 2019-06-11 PROCEDURE — 74177 CT ABD & PELVIS W/CONTRAST: CPT

## 2019-06-11 PROCEDURE — 71045 X-RAY EXAM CHEST 1 VIEW: CPT

## 2019-06-11 PROCEDURE — 99285 EMERGENCY DEPT VISIT HI MDM: CPT | Mod: 25

## 2019-06-11 PROCEDURE — 80048 BASIC METABOLIC PNL TOTAL CA: CPT

## 2019-06-11 PROCEDURE — 87086 URINE CULTURE/COLONY COUNT: CPT

## 2019-06-11 PROCEDURE — 83690 ASSAY OF LIPASE: CPT

## 2019-06-11 PROCEDURE — 83605 ASSAY OF LACTIC ACID: CPT

## 2019-06-11 PROCEDURE — 96374 THER/PROPH/DIAG INJ IV PUSH: CPT

## 2019-06-11 RX ADMIN — HEPARIN SODIUM 5000 UNIT(S): 5000 INJECTION INTRAVENOUS; SUBCUTANEOUS at 05:17

## 2019-06-11 RX ADMIN — FAMOTIDINE 20 MILLIGRAM(S): 10 INJECTION INTRAVENOUS at 05:17

## 2019-06-11 RX ADMIN — Medication 50 MICROGRAM(S): at 05:18

## 2019-06-11 NOTE — DISCHARGE NOTE PROVIDER - HOSPITAL COURSE
74 yo male admitted for recurrent SBO. Pt was treated conservatively with NGT to lws, and daily AXRs. Once pt regained bowel function a low fiber diet was started    and he was discharged home once tolerating it well.

## 2019-06-11 NOTE — DISCHARGE NOTE NURSING/CASE MANAGEMENT/SOCIAL WORK - NSDCDPATPORTLINK_GEN_ALL_CORE
You can access the [facility] Patient Portal, offered by [facility], by registering with the following website: [email or web address]

## 2019-06-11 NOTE — DISCHARGE NOTE PROVIDER - CARE PROVIDER_API CALL
Gurpreet Meehan)  Surgery  [address] [address] [place] [address]  Phone: [phone]  Fax: [phone]  Follow Up Time:

## 2019-06-11 NOTE — PROGRESS NOTE ADULT - SUBJECTIVE AND OBJECTIVE BOX
Patient seen and examined at bedside. Resting comfortably in bed, in NAD. NGT removed yesterday per patient. Passing flatus, denies BM. Pain resolved. Denies N/V. HD stable and afebrile. Rpt XR showed contrast in colon.     Vital Signs Last 24 Hrs  T(C): 36.8 (10 Nilay 2019 05:17), Max: 36.9 (09 Jun 2019 21:37)  T(F): 98.3 (10 Nilay 2019 05:17), Max: 98.4 (09 Jun 2019 21:37)  HR: 64 (10 Nilay 2019 05:17) (64 - 77)  BP: 120/60 (10 Nilay 2019 05:17) (109/61 - 120/60)  BP(mean): --  RR: 18 (10 Nilay 2019 05:17) (18 - 18)  SpO2: 97% (10 Nilay 2019 05:17) (97% - 99%)                          11.2   4.79  )-----------( 250      ( 10 Nilay 2019 06:46 )             34.1   06-10    141  |  109<H>  |  11  ----------------------------<  117<H>  3.3<L>   |  25  |  0.76    Ca    7.7<L>      10 Nilay 2019 06:46  Mg     2.1     06-10    TPro  8.1  /  Alb  3.9  /  TBili  0.8  /  DBili  x   /  AST  42<H>  /  ALT  52  /  AlkPhos  91  06-08    General: AAOx3, resting comfortably in bed, in NAD  Resp: Reg resp effort on RA  Abd: Soft, mildly distended, non tender to palpation. No rebound, rigidity, or guarding    A/P:  73 M with partial SBO, passing flatus, pain resolved, no N/V  -Advance to clears  -Replete K for hypokalemia  -OOBTC and encourage ambulation  -Continue Heparin for DVT PPx  -Continue synthroid for hypothyroidism
SURGERY ATTENDING PROGRESS NOTE     Interval History:   Patient seen and examined by me, and discussed with the surgical team.    There were no untoward or unexpected events overnight.     Patient reports abdominal pain, and no nausea or emesis. Patient has been able to tolerate PO intake without difficulty.     Physical Examination:  General:  Appears comfortable, in NAD  HEENT:  Sclera anicteric  Lungs:  Non-labored breathing, no cough or wheezing  Heart:  Pulse regular  Abdomen:  Soft, nontender, nondistended.      Assessment and Plan:     Patient doing well overall.  He is tolerating diet and PSBO resolved.   Patient may be discharged to home, with follow up with PCP.     All of patient's questions and concerns addressed to patient’s satisfaction and patient verbalized an understanding of the information discussed.
INTERVAL HPI/OVERNIGHT EVENTS:  Pt resting comfortably. No acute complaints.   NGT to LWS.  -flatus/BM.  Denies N/V, abd pain.    MEDICATIONS  (STANDING):  acetaminophen  IVPB .. 1000 milliGRAM(s) IV Intermittent once  dextrose 5% + sodium chloride 0.45% with potassium chloride 10 mEq/L 1000 milliLiter(s) (125 mL/Hr) IV Continuous <Continuous>  famotidine Injectable 20 milliGRAM(s) IV Push two times a day  heparin  Injectable 5000 Unit(s) SubCutaneous every 8 hours  levothyroxine Injectable 25 MICROGram(s) IV Push at bedtime    Vital Signs Last 24 Hrs  T(C): 36.9 (09 Jun 2019 05:56), Max: 37.1 (09 Jun 2019 01:29)  T(F): 98.4 (09 Jun 2019 05:56), Max: 98.7 (09 Jun 2019 01:29)  HR: 85 (09 Jun 2019 05:56) (82 - 96)  BP: 125/61 (09 Jun 2019 05:56) (108/63 - 125/61)  BP(mean): --  RR: 18 (09 Jun 2019 05:56) (16 - 18)  SpO2: 97% (09 Jun 2019 05:56) (97% - 99%)    Physical:  General: A&Ox3. NAD.  Abdomen: Soft distended, nontender. Tympanic    I&O's Detail    08 Jun 2019 07:01  -  09 Jun 2019 07:00  --------------------------------------------------------  IN:  Total IN: 0 mL    OUT:    Indwelling Catheter - Urethral: 350 mL    Nasoenteral Tube: 50 mL light bilious  Total OUT: 400 mL    Total NET: -400 mL    LABS:                        12.1   8.87  )-----------( 284      ( 09 Jun 2019 05:30 )             36.8             06-09    141  |  109<H>  |  27<H>  ----------------------------<  123<H>  3.0<L>   |  25  |  0.82    Ca    7.9<L>      09 Jun 2019 05:30    TPro  8.1  /  Alb  3.9  /  TBili  0.8  /  DBili  x   /  AST  42<H>  /  ALT  52  /  AlkPhos  91  06-08

## 2019-06-11 NOTE — DISCHARGE NOTE PROVIDER - NSDCCPCAREPLAN_GEN_ALL_CORE_FT
PRINCIPAL DISCHARGE DIAGNOSIS  Diagnosis: SBO (small bowel obstruction)  Assessment and Plan of Treatment: please continue current diet. f/u with Dr Meehan in 1-2 weeks

## 2019-07-04 ENCOUNTER — EMERGENCY (EMERGENCY)
Facility: HOSPITAL | Age: 74
LOS: 0 days | Discharge: HOME | End: 2019-07-05
Attending: EMERGENCY MEDICINE
Payer: COMMERCIAL

## 2019-07-04 VITALS
RESPIRATION RATE: 16 BRPM | DIASTOLIC BLOOD PRESSURE: 74 MMHG | HEART RATE: 98 BPM | OXYGEN SATURATION: 97 % | HEIGHT: 65 IN | TEMPERATURE: 98 F | SYSTOLIC BLOOD PRESSURE: 139 MMHG | WEIGHT: 190.04 LBS

## 2019-07-04 DIAGNOSIS — S13.4XXA SPRAIN OF LIGAMENTS OF CERVICAL SPINE, INITIAL ENCOUNTER: ICD-10-CM
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
## 2019-07-04 DIAGNOSIS — Z98.890 OTHER SPECIFIED POSTPROCEDURAL STATES: Chronic | ICD-10-CM

## 2019-07-04 DIAGNOSIS — N40.0 BENIGN PROSTATIC HYPERPLASIA WITHOUT LOWER URINARY TRACT SYMPTOMS: ICD-10-CM

## 2019-07-04 DIAGNOSIS — I10 ESSENTIAL (PRIMARY) HYPERTENSION: ICD-10-CM

## 2019-07-04 DIAGNOSIS — M43.6 TORTICOLLIS: ICD-10-CM

## 2019-07-04 PROCEDURE — 99053 MED SERV 10PM-8AM 24 HR FAC: CPT

## 2019-07-04 PROCEDURE — 99283 EMERGENCY DEPT VISIT LOW MDM: CPT
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
## 2019-07-05 RX ORDER — METHOCARBAMOL 500 MG/1
750 TABLET, FILM COATED ORAL ONCE
Refills: 0 | Status: COMPLETED | OUTPATIENT
Start: 2019-07-05 | End: 2019-07-05

## 2019-07-05 RX ORDER — ACETAMINOPHEN 500 MG
650 TABLET ORAL ONCE
Refills: 0 | Status: COMPLETED | OUTPATIENT
Start: 2019-07-05 | End: 2019-07-05

## 2019-07-05 RX ADMIN — METHOCARBAMOL 750 MILLIGRAM(S): 500 TABLET, FILM COATED ORAL at 02:01

## 2019-07-05 RX ADMIN — Medication 650 MILLIGRAM(S): at 02:01
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
## 2020-04-30 NOTE — ED ADULT NURSE NOTE - NS ED NOTE  TALK SOMEONE YN
Call Center TCM Note      Responses   [facility], [place], [facility] patient discharged from?  [facility]   COVID-19 Test Status  Not tested   Does the patient have one of the following disease processes/diagnoses(primary or secondary)?  Other   TCM attempt successful?  No   Unsuccessful attempts  Attempt 1   Call Status  Rescheduled          Miryam Quan RN    4/30/2020, 10:00      
No

## 2021-07-27 NOTE — DIETITIAN INITIAL EVALUATION ADULT. - PROBLEM SELECTOR PROBLEM 1
Is This A New Presentation, Or A Follow-Up?: Skin Lesion Has Your Skin Lesion Been Treated?: not been treated Additional History: Yearly check SBO (small bowel obstruction)

## 2023-01-25 NOTE — ED ADULT NURSE NOTE - NS ED NURSE LEVEL OF CONSCIOUSNESS ORIENTATION
Oriented - self; Oriented - place; Oriented - time Enbrel Counseling:  I discussed with the patient the risks of etanercept including but not limited to myelosuppression, immunosuppression, autoimmune hepatitis, demyelinating diseases, lymphoma, and infections.  The patient understands that monitoring is required including a PPD at baseline and must alert us or the primary physician if symptoms of infection or other concerning signs are noted.

## 2024-01-08 NOTE — PATIENT PROFILE ADULT - NSSTREETDRUGUSE_GEN_A_NUR
EMS Transport Request  For use at [facility], [place], [place], and [place] only   Patient Name: Pati Carter : 1932   Weight:52.3 kg (115 lb 3.2 oz) Pick-up Location: [facility] BLS/ALS: BLS/ALS: BLS   Insurance: MEDICARE Auth End Date:    Pre-Cert #: D/C Summary complete:    Destination: Other [facility] [place] [facility] [place]   Contact Precautions: None   Equipment (O2, Fluids, etc.): None   Arrive By Date/Time: Monday, afternoon, 1/15/24 Stretcher/WC: Stretcher   CM Requesting: Vikki Schumacher RN Ext:    Notes/Medical Necessity:      ______________________________________________________________________    *Only 2 patient bags OR 1 carry-on size bag are permitted.  Wheelchairs and walkers CANNOT transported with the patient. Acknowledge: Yes    
never used

## 2024-04-14 NOTE — PATIENT PROFILE ADULT - NSPROGENBLOODRESTRICT_GEN_A_NUR
Problem: Discharge Planning  Goal: Discharge to home or other facility with appropriate resources  4/13/2024 2224 by Shruthi Langston RN  Outcome: Progressing  4/13/2024 1310 by Jackie Mackay RN  Outcome: Progressing     Problem: Pain  Goal: Verbalizes/displays adequate comfort level or baseline comfort level  4/13/2024 2224 by Shruthi Langston RN  Outcome: Progressing  Flowsheets (Taken 4/13/2024 2223)  Verbalizes/displays adequate comfort level or baseline comfort level:   Encourage patient to monitor pain and request assistance   Assess pain using appropriate pain scale   Administer analgesics based on type and severity of pain and evaluate response   Implement non-pharmacological measures as appropriate and evaluate response   Consider cultural and social influences on pain and pain management   Notify Licensed Independent Practitioner if interventions unsuccessful or patient reports new pain  4/13/2024 1310 by Jackie Mackay RN  Outcome: Progressing     Problem: Safety - Adult  Goal: Free from fall injury  4/13/2024 2224 by Shruthi Langston RN  Outcome: Progressing  4/13/2024 1310 by Jackie Mackay RN  Outcome: Progressing     Problem: ABCDS Injury Assessment  Goal: Absence of physical injury  4/13/2024 2224 by Shruthi Langston RN  Outcome: Progressing  4/13/2024 1310 by Jackie Mackay RN  Outcome: Progressing     Problem: Skin/Tissue Integrity  Goal: Absence of new skin breakdown  Description: 1.  Monitor for areas of redness and/or skin breakdown  2.  Assess vascular access sites hourly  3.  Every 4-6 hours minimum:  Change oxygen saturation probe site  4.  Every 4-6 hours:  If on nasal continuous positive airway pressure, respiratory therapy assess nares and determine need for appliance change or resting period.  4/13/2024 2224 by Shruthi Langston RN  Outcome: Progressing  4/13/2024 1310 by Jackie Mackay RN  Outcome: Progressing      none

## 2024-10-29 NOTE — ED ADULT NURSE NOTE - CADM POA URETHRAL CATHETER
Tdap; 29-Aug-2019 16:58; Sandra Maier (RN); [employer]; [id number] (Exp. Date: 07-Apr-2021); IntraMuscular; Deltoid Left.; 0.5 milliLiter(s); VIS (VIS Published: 09-May-2013, VIS Presented: 29-Aug-2019);   
No